# Patient Record
Sex: FEMALE | Race: ASIAN | Employment: FULL TIME | URBAN - METROPOLITAN AREA
[De-identification: names, ages, dates, MRNs, and addresses within clinical notes are randomized per-mention and may not be internally consistent; named-entity substitution may affect disease eponyms.]

---

## 2017-05-15 NOTE — TELEPHONE ENCOUNTER
Patient received a phone call from the automated system reminding patients to schedule for their follow up. Patient was reminded for HTN/CHOL. Patient stated she does not want to schedule an appointment, if there is no need for her to come in.  She state

## 2017-06-15 PROBLEM — M85.80 OSTEOPENIA: Status: ACTIVE | Noted: 2017-06-15

## 2017-06-15 NOTE — PROGRESS NOTES
HPI:    Patient ID: Joseph Spine is a 63year old female. HPI  Joseph Spine is a 63year old female. HPI:   Patient presents for recheck of her hypertension.  Pt has been taking medications as instructed, no medication side effects, Rfl: 0   Rosuvastatin Calcium 10 MG Oral Tab Take 1 tablet (10 mg total) by mouth nightly. Disp: 90 tablet Rfl: 0   TraMADol HCl (ULTRAM) 50 MG Oral Tab Take 1 tablet (50 mg total) by mouth every 6 (six) hours as needed.  Disp: 90 tablet Rfl: 0   Naproxen S well developed, well nourished,in no apparent distress  NECK: supple,no adenopathy,  LUNGS: clear to auscultation  CARDIO: RRR without murmur  EXTREMITIES: no cyanosis, clubbing or edema    ASSESSMENT AND PLAN:   Pt presents for a recheck of her hypertensi Visit:  Signed Prescriptions Disp Refills    AmLODIPine Besylate 2.5 MG Oral Tab 90 tablet 0      Sig: Take 1 tablet (2.5 mg total) by mouth daily.            Imaging & Referrals:  None       II#[id number]

## 2017-06-30 NOTE — TELEPHONE ENCOUNTER
From: Julian Frausto  Sent: 6/29/2017 3:57 PM CDT  Subject: Medication Renewal Request    Miesha Miller would like a refill of the following medications:  TraMADol HCl (ULTRAM) 50 MG Oral Tab    Preferred pharmacy: [facility]

## 2017-07-20 NOTE — TELEPHONE ENCOUNTER
Per patient they are not processing her refill on tramadol because they do not have the DION[employer] number for Dr. Kevon Neal on file. Ph. [phone].

## 2017-07-20 NOTE — TELEPHONE ENCOUNTER
[facility] be faxed to them at [phone] pt informed faxed today as pharmacy requested

## 2017-07-20 NOTE — TELEPHONE ENCOUNTER
Patient called in stating she spoke with [id number] Dr Damaso Cronin and they stated they have not received the refill auth for Tramadol as yet.

## 2017-12-07 NOTE — PROGRESS NOTES
CHIEF COMPLAINT:     Patient presents with:  Blood Pressure: Follow up. HPI:   Ryann King is a 63year old female   Patient presents for recheck of  Hypertension and hyperlipdemia.  Pt has been taking medications as instructed, no medicatio Social History:  Smoking status: Never Smoker                                                              Smokeless tobacco: Never Used                      Alcohol use:  No                 REVIEW OF SYSTEMS:   GENERAL: Denies fever, chills,weight change This Encounter      COMP METABOLIC PANEL      CBC With Differential With Platelet      TSH      LIPID PANEL      VITAMIN D, 25-HYDROXY      Hemoglobin A1C [E]    Meds & Refills for this Visit:  Signed Prescriptions Disp Refills    Rosuvastatin Calcium 10 M

## 2018-03-08 NOTE — PROGRESS NOTES
HPI:    Patient ID: Gwyn Fry is a 63year old female. HPI  Gwyn Fry is a 63year old female.   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  2011: KNEE REPLACEMENT SURGERY Right      Comment: Maria Luisa  01/01/1999: LAPAROSCOPY PROCEDURE UNLISTED      Comment: (diagnostic) for endometriosis  2006: ABIMBOLA BIOPSY STEREOTA understanding of these issues and agrees to the plan. The patient is asked to return based on studies . Review of Systems           Current Outpatient Prescriptions: AmLODIPine Besylate 2.5 MG Oral Tab Take 1 tablet (2.5 mg total) by mouth daily.  DUE

## 2018-03-08 NOTE — TELEPHONE ENCOUNTER
Patient called to request an order for stress test. Patient would also like to know if she needs pre certification in order to schedule an appointment

## 2018-03-12 NOTE — TELEPHONE ENCOUNTER
Patient called central scheduling to schedule    Procedure:  CARD NUC EXERCISE STRESS TEST (CPT=93017/65018) [[id number]].  Patient was instructed to call our office to follow up on pending referral. Please call

## 2018-03-12 NOTE — TELEPHONE ENCOUNTER
Please call pt and have them contact referral Dept they do all authorizations for tests and can advise her on status

## 2018-03-13 NOTE — TELEPHONE ENCOUNTER
----- Message from Stefan Renee NP sent at 3/10/2018  8:24 AM CST -----  Hgba1c 6.1 which is up from 1 yr ago.  Pt needs to watch starchy/carb foods as they can add to sugar load and try to increase activity especially walking as you will burn up the sugar

## 2018-03-14 NOTE — TELEPHONE ENCOUNTER
EKG faxed numerous times unsuccessful,Verified with Cardiology this is correct fax # She is scheduled in office this Friday 03/16/18

## 2018-03-14 NOTE — TELEPHONE ENCOUNTER
Message     Refer to Dr Danika Cabrera for abn EKG        ----- Message -----   From: Arturo Castro LPN   Sent: 4/30/9229   3:39 PM   To:  Alexis Herrmann MD            ----- Message -----   From: Samantha Sun CNA   Sent: 3/13/2018   3:24 PM   To: [facility]

## 2018-03-14 NOTE — TELEPHONE ENCOUNTER
Patient stated that she contacted the cardiologist to make an appointment. She stated that they are unable to get her in until July. She would like to speak with a nurse. She said to call her after 1:15pm today at [phone].

## 2018-03-16 NOTE — TELEPHONE ENCOUNTER
Patient called to speak with nurse regarding Potassium Chloride ER 20 MEQ. Patient states pharmacy had concern that medication would cause increased heart rate.

## 2018-05-12 NOTE — PROGRESS NOTES
CC: Preop exam.    Gwyn Fry is a 63year old female who presents for a pre-operative physical exam  By Dr. Kelly santiago. Patient is to have left TKR, secondary to arthritis to be on 6/25/18 at [facility]. No prior anaesthesia problem.  Pt compl UNLISTED      Comment: (diagnostic) for endometriosis  2006: ABIMBOLA BIOPSY STEREOTACTIC NODULE 2 SITE BILAT      Comment: negra  1976: ABIMBOLA NEEDLE LOCALIZATION W/ SPECIMEN 1 SITE RIG*      Comment: negra  01/01/2002: OTHER SURGICAL HISTORY      Comment: excision o back is not tender, FROM of the back  EXTREMITIES: no cyanosis, clubbing or edema  NEURO: Oriented times three,cranial nerves are intact,motor and sensory are grossly intact    ASSESSMENT AND PLAN:   Ivory Wetzel is a 63year old female who presen

## 2018-07-30 NOTE — TELEPHONE ENCOUNTER
It is not unusual to have some slight variability with the US studies when they measure .   In reviewing the reports, it looks stable

## 2018-08-09 NOTE — TELEPHONE ENCOUNTER
Requesting ATENOLOL-CHLORTHALIDONE 100-25 MG Oral Tab  LOV: 05/12/2018 with Courtney  RTC: NA  Last Relevant Labs: 03/09/2018  Filled: 05/11/2018 #90 with 0 refills    No future appointments.

## 2018-08-14 NOTE — ED PROVIDER NOTES
Patient Seen in: [facility]    History   Patient presents with:  Abdominal Pain  Diarrhea    Stated Complaint: diarrhea 3days    72-year-old female presents today with complaints of diarrhea and generalized abdominal pain start foot    Family history reviewed and is not pertinent to presenting problem. Smoking status: Never Smoker                                                              Smokeless tobacco: Never Used                      Alcohol use:  No                Revie CULTURE, ROUTINE       ED Course as of Aug 14 1107  ------------------------------------------------------------  Ct Abdomen+pelvis(contrast Only)(cpt=74177)    Result Date: 8/14/2018  PROCEDURE:  CT ABDOMEN+PELVIS (CONTRAST ONLY) (CPT=74177)  COMPARISON: MDM     Patient presents today with generalized abdominal pain and diarrhea. CMP did show a potassium of 2.9. CBC within normal limits.   CT scan did show pancolitis as well as a diffuse fatty infiltration of liver and a known abdominal aortic ane

## 2018-08-14 NOTE — ED INITIAL ASSESSMENT (HPI)
C/o abdominal pain/cramping on and off since Saturday with diarrhea about 4-5 times each days, with fever, muscle aches and cold sweats. Taking Imodium. Denies nausea nor vomiting. Tolerating fluids. Denies urinary symptoms. Denies recent travel.

## 2018-08-17 NOTE — PROGRESS NOTES
Darling Miller is a 66year old female.    HPI:   Patient presents with:  Urgent Care F/u: Acute colitis would like to follow up and learn more about colitis   Test Results: would like to go over stool test and lab work   Patient presents for follow TAKE 1 TABLET DAILY (DUE FOR FASTING LABS), Disp: 90 tablet, Rfl: 0  •  Rosuvastatin Calcium 10 MG Oral Tab, Take 1 tablet (10 mg total) by mouth nightly.  DUE FOR FASTING LABS!, Disp: 90 tablet, Rfl: 0  •  TraMADol HCl (ULTRAM) 50 MG Oral Tab, Take 1 table hepatosplenomegaly, bowel sounds throughout. Mild tenderness to palpation in several quadrants of abdomen  PSYCH: pleasant, appropriate mood and affect  ASSESSMENT AND PLAN:   1. Pancolitis  Acute issue, seen on CT scan at immediate care.   Patient's diar General (Family Practice)  The patient indicates understanding of these issues and agrees to the plan. The patient is asked to return to clinic in 1-2 months with Dr. Rusty García MD for follow up on chronic issues, or earlier if acute issues arise.     R

## 2018-08-17 NOTE — PATIENT INSTRUCTIONS
- Continue antibiotics until they are finished  - Follow up with Dr. Pallavi Jarvis clinic as scheduled. - Get blood tests done any time after Labor day. Try to fast for the blood tests. It was a pleasure seeing you in the clinic today.   Thank you for cho

## 2018-09-04 NOTE — TELEPHONE ENCOUNTER
Medication(s) to Refill:   Pending Prescriptions Disp Refills    AMLODIPINE BESYLATE 2.5 MG Oral Tab [Pharmacy Med Name: AMLODIPINE BESYLATE TABS 2.5MG] 90 tablet 0     Sig: TAKE 1 TABLET DAILY (DUE FOR FASTING LABS)           Last Time Medication was Fill

## 2018-10-08 DIAGNOSIS — E78.00 PURE HYPERCHOLESTEROLEMIA: ICD-10-CM

## 2018-10-08 RX ORDER — ROSUVASTATIN CALCIUM 10 MG/1
10 TABLET, COATED ORAL NIGHTLY
Qty: 90 TABLET | Refills: 0 | OUTPATIENT
Start: 2018-10-08

## 2018-10-08 NOTE — TELEPHONE ENCOUNTER
Last office visit 8/17/18 with Dr. Bozena Modi. Last lipid 3/9/18. Last refill 3/7/18 (#90, 0 refills).

## 2018-11-24 NOTE — PATIENT INSTRUCTIONS
Triglycerides  Does this test have other names? Lipid panel, fasting lipoprotein panel  What is this test?  This test measures the amount of triglycerides in your blood. Triglycerides are one of several types of fats in your blood.  Other kinds are LDL · 150 to 199 mg/dL: Borderline high  · 200 to 499 mg/dL: High  · 500 mg/dL and above: Very high  If you have a high triglyceride level, you have a greater risk for heart attack and stroke.   A triglyceride level above 150 mg/dL also means that you may have Not fasting for the required length of time before the test can affect your results. Certain medicines can affect your results, as can drinking alcohol.   How do I prepare for the test?  If you have this test as part of a cholesterol screening, you will nee

## 2018-11-24 NOTE — PROGRESS NOTES
Patient presents with:  Physical    HPI:  Concerned regarding recent triglyceride results. States she has been losing weight and dieting but numbers got worse.      Annual Depression Screen due on 05/12/2019  Mammogram due on 07/28/2019  Pap Smear,3 Years d Oral Tab Take 1 tablet (10 mg total) by mouth nightly. DUE FOR FASTING LABS!  Disp: 90 tablet Rfl: 0   AMLODIPINE BESYLATE 2.5 MG Oral Tab TAKE 1 TABLET DAILY (DUE FOR FASTING LABS) Disp: 90 tablet Rfl: 0   Cholecalciferol (VITAMIN D3) 1000 UNITS Oral Cap T without inflammation. Bilateral tympanic membranes translucent, mild fluid behind left TM. Hearing grossly normal.  EYES: PERRLA, EOMI, bilateral sclera anicteric, non-injected. Conjunctiva pink, fundi wnl. NOSE: Mucosa appears healthy.    OROPHARYNX: Muco

## 2019-02-06 NOTE — TELEPHONE ENCOUNTER
Approved per protocol  Atenolol  Last OV relevant to medication: 5-12-18  Last refill date: 11-7-18     #/refills: 0  When pt was asked to return for OV: none  Upcoming appt/reason: none  Recent labs: 10-26-18: Pathology tissue  10-24-18: HgBA1C/ Lipid/ CM

## 2019-03-04 NOTE — TELEPHONE ENCOUNTER
Amlodipine approved per protocol  Last OV relevant to medication: 11-24-18  Last refill date: 12-3-18  #/refills: 0  When pt was asked to return for OV: 1 yr CPX  Upcoming appt/reason: none  Recent labs: 10-24-18: Lipid/ HgBA1C/ CMP

## 2019-04-26 DIAGNOSIS — E78.00 PURE HYPERCHOLESTEROLEMIA: ICD-10-CM

## 2019-04-27 RX ORDER — ROSUVASTATIN CALCIUM 10 MG/1
TABLET, COATED ORAL
Qty: 90 TABLET | Refills: 0 | OUTPATIENT
Start: 2019-04-27

## 2019-04-27 NOTE — TELEPHONE ENCOUNTER
Last OV: 11/24/18 with REBEKA Mina  Last refill date: 1/25/19     #/refills: #90, 0 refills  When pt was asked to return for OV: 1 year  Upcoming appt/reason: no upcoming appt  Last labs 10/24/18

## 2019-05-06 NOTE — TELEPHONE ENCOUNTER
Rosuvastatin  Last OV relevant to medication: 11-24-18  Last refill date: 1-25-19  #/refills: 0  When pt was asked to return for OV: Cpx 1 yr  Upcoming appt/reason: none  Recent labs: 10-24-18: Lipid

## 2019-05-07 DIAGNOSIS — I10 ESSENTIAL HYPERTENSION: ICD-10-CM

## 2019-05-07 NOTE — TELEPHONE ENCOUNTER
Lm for pt to return phone call.   Pt is due for 6 month f/u BP    Atenolol-Chlorthalidone  Last OV relevant to medication: 11-24-18  Last refill date: 2-6-19  #/refills: 0  When pt was asked to return for OV: CPX 1 yr  Upcoming appt/reason: none  Recent labs: 10-24-18: CMP

## 2019-05-09 RX ORDER — ATENOLOL AND CHLORTHALIDONE TABLET 100; 25 MG/1; MG/1
TABLET ORAL
Qty: 45 TABLET | Refills: 0 | Status: SHIPPED | OUTPATIENT
Start: 2019-05-09 | End: 2019-10-09

## 2019-06-03 NOTE — TELEPHONE ENCOUNTER
Pt is scheduled for HTN appt. With Cristiano @10am. Pt states if any blood work is required, if we can order it so she can get it done prior to coming in. Please advise, pt would like a call back or [employer] message to let her know about labs.   Amlodipine f

## 2019-06-14 NOTE — PROGRESS NOTES
CHIEF COMPLAINT:     Patient presents with:  Hypertension: Bp f/u      HPI:   Bibiana Sanchez is a 66year old female   Patient presents for recheck of  Hypertension and hyperlipdemia.  Pt has been taking medications as instructed, no medication side eff Never Used    Alcohol use: No      Alcohol/week: 0.0 oz    Drug use: No       REVIEW OF SYSTEMS:   GENERAL: Denies fever, chills,weight change, decreased appetite  SKIN: Denies rashes, skin wounds or ulcers.   EYES: Denies blurred vision or double vision  H history of pancreatic cancer  - Pt's brother had pancreatic cancer and wants to know if there is any test to check her for it. I explained this test is more of a screening test but Pt would like it done. - CARBOHYDRATE ANTIGEN 19-9; Future    4.  Essential

## 2019-06-26 NOTE — TELEPHONE ENCOUNTER
Notes recorded by THERESE Gill on 6/15/2019 at 8:42 AM CDT  Pt has immunity so Pt did have chicken pox when she was younger and so she can get the Shingles vaccine thru her pharmacy. Hgba1c up from 7 months ago. Pt to watch her diet, exercise.  [place]

## 2019-08-15 NOTE — PATIENT INSTRUCTIONS
Treating Gout Attacks     Raising the joint above the level of your heart can help reduce gout symptoms. Gout is a disease that affects the joints. It is caused by excess uric acid in your blood that may lead to crystals forming in your joints.  Left ? Shellfish (lobster, crab, shrimp, scallop, mussel)  ? Certain fish (anchovy, sardine, herring, mackerel)  · Take any medicines prescribed by your healthcare provider. · Lose weight if you need to. · Reduce high fructose corn syrup in meals and drinks.

## 2019-08-15 NOTE — PROGRESS NOTES
CHIEF COMPLAINT:     Patient presents with: Foot Pain: Rt foot pt is having pain and has swelling on the top of her foot, Started Tuesday felt like a strain, but then the upper foot started hurting. Swelling started yesterday.  OTC ibprofen      HPI:   [de-identified] Denies skin wounds or ulcers.   HENT: Denies congestion, rhinorrhea, sore throat or ear pain  CHEST: Denies chest pain, or palpitations  LUNGS: Denies shortness of breath, cough, or wheezing  GI: Denies abdominal pain, N/V/C/D.   MUSCULOSKELETAL: right foot

## 2019-10-10 NOTE — TELEPHONE ENCOUNTER
Pharmacy pt requested us to send medication to does not exsit or wrong info. Was typed in incorrect. [facility] has location in [place] [address] or [facility]. Need to call pt.     Rosuvastatin 10 Mg  Last OV relevant to medication: 6-14-19  Last refill robi

## 2019-10-16 NOTE — TELEPHONE ENCOUNTER
Original order was sent to wrong Pharmacy on 10/10/19. Needed to be sent to [facility].

## 2019-10-23 NOTE — PROGRESS NOTES
CHIEF COMPLAINT:     Patient presents with:  Hypertension: Pt brought in Wellness form to be filled out for work. Needs flu shot. HPI:   Gabriele Mandujano is a 60year old female   Patient presents for recheck of  Hypertension and hyperlipdemia.  Pt congestion, rhinorrhea, sore throat or ear pain  CHEST: Denies chest pain, or palpitations  LUNGS: Denies shortness of breath, cough, or wheezing  NEURO: Denies headaches or lightheadedness      EXAM:   /72 (BP Location: Left arm, Patient Position: S understanding of these issues and agrees to the plan. The patient is asked to return in ^ months for BP check but Edis for Px.

## 2019-11-18 NOTE — TELEPHONE ENCOUNTER
From: Kitty Pugh  To: THERESE Lozano  Sent: 11/18/2019 9:40 AM CST  Subject: Prescription Question    I visited you last time regarding Gout. It's back and I'm having a lot of pain and could hardly walk.  Can you possibly call in for the same

## 2020-01-25 NOTE — TELEPHONE ENCOUNTER
Pt scheduled diabetes education however she then called to cancel. Although it is covered she would have to meet her deductible first. [facility]. Hopefully she will schedule once her deductible is met.

## 2020-02-24 NOTE — TELEPHONE ENCOUNTER
Sent my chart message stating pt is due for CPX    Amlodipine 2.5 mg  Last OV relevant to medication: 10-23-19  Last refill date: 10-23-19 #/refills: 0  When pt was asked to return for OV: Jan.2020  Upcoming appt/reason: none  Recent labs: 12-12-19: CMP

## 2020-03-09 NOTE — TELEPHONE ENCOUNTER
Pt is to f/u in 3 mo. For lab work, she has upcoming appt. On 4-4-2020.      Metformin HCl  mg failed protocol due to  Diabetic Medication Protocol Failed3/7 4:29 PM   Microalbumin procedure in past 12 months or taking ACE/ARB   Last OV relevant to me

## 2020-04-02 NOTE — TELEPHONE ENCOUNTER
Metformin 500 mg failed protocol due to  Diabetic Medication Protocol Failed4/2 1:27 PM   Microalbumin procedure in past 12 months or taking ACE/ARB   Last OV relevant to medication: 6-14-19  Last refill date: 1-6-20 #/refills: 0  When pt was asked to retu

## 2020-05-05 NOTE — TELEPHONE ENCOUNTER
Difficult to say, could be plantar fascitis. Needs visit in office to assess foot.  Schedule for 1-2 weeks from now in office when we start to bring more patients back to office visits

## 2020-05-05 NOTE — TELEPHONE ENCOUNTER
From: Adrian Paul  To: THERESE Croft  Sent: 5/5/2020 2:45 PM CDT  Subject: Non-Urgent Medical Question    Continuation, I'm practicing social distancing and voluntary quarantine. Should I get an X-ray, Ultrasound or CT to find the casue?  Does i

## 2020-05-18 NOTE — PROGRESS NOTES
Sandra Gann is a 60year old female. Patient presents with:   Foot Pain: patient states that for 2 weeks shes been having right foot pain and swelling - very painful to walk - pain scale 10/10 - does not recall any injury to the foot - takes ibuprofe [address]   • D & C  1995, [id number]   • DILATION & CURETTAGE CERVICAL STUMP      x5 pt has had 5 miscarraiges and  a D&C after each one   • EXCISION, INFEC GRAFT, NECK  late 1980    nodule removed from R side of neck the nodule was proven to be  a  [employer] distress  EXTREMITIES:   1. Integument: Normal skin temperature and turgor  2.  Vascular: Dorsalis pedis two out of four bilateral and posterior tibial pulses two out of   four bilateral, capillary refill normal.   3. Musculoskeletal: All muscle groups are

## 2020-06-19 PROBLEM — E11.9 TYPE 2 DIABETES MELLITUS WITHOUT COMPLICATION, WITHOUT LONG-TERM CURRENT USE OF INSULIN (HCC): Status: ACTIVE | Noted: 2020-06-19

## 2020-06-19 NOTE — PROGRESS NOTES
Patient presents with:  Physical: With Pap. HPI:  Pt is here for physical and pap. Pt feels a pulsation in her chest/upper abdomen at times. Pt has hx of AAA. Pt had recent US and AAA has shrunk in size.      Pap Smear,3 Years due on 11/08/2019  [facility] Medications   Medication Sig Dispense Refill   • Rosuvastatin Calcium 10 MG Oral Tab Take 1 tablet (10 mg total) by mouth nightly. 90 tablet 0   • Fenofibrate 145 MG Oral Tab Take 1 tablet (145 mg total) by mouth daily.  90 tablet 0   • METFORMIN HCL  Size: adult)   Pulse 78   Resp 16   Ht 59\"   Wt 146 lb (66.2 kg)   BMI 29.49 kg/m²   NAD  GENERAL: well developed, well nourished, in no apparent distress.   EARS: Bilateral pinna / tragus are WNL in appearance, External canals patent and without inflammat THINPREP PAP SMEAR B  - HPV HIGH RISK , THIN PREP COLLECTION    3. Pure hypercholesterolemia  - Rosuvastatin Calcium 10 MG Oral Tab; Take 1 tablet (10 mg total) by mouth nightly. Dispense: 90 tablet; Refill: 0  - Fenofibrate 145 MG Oral Tab;  Take 1 tablet

## 2020-07-08 NOTE — TELEPHONE ENCOUNTER
Metformin 500 mg failed protocol due to  Diabetic Medication Protocol Failed7/7 6:40 PM   Microalbumin procedure in past 12 months or taking ACE/ARB   Last OV relevant to medication: 6-19-20  Last refill date:  #/refills:  When pt was asked to return for O

## 2020-09-16 NOTE — TELEPHONE ENCOUNTER
LOV: 6/19/2020 with REBEKA Cobb  RTC: 6 months  Last Relevant Labs: June/July 2020   Filled: 6/19/2020  #90 with 0 refill    No future appointments.

## 2020-10-02 NOTE — TELEPHONE ENCOUNTER
From: Adrian Paul  To: Jenae Bernabe MD  Sent: 10/1/2020 5:53 PM CDT  Subject: Non-Urgent Medical Question    Can you please recommend a doctor who can treat neck radicular pain?  The pain is coming from the neck to the back shoulder and to the right

## 2020-10-06 NOTE — TELEPHONE ENCOUNTER
Spoke with patient scheduled OV with KR on 10/9/2020 for evaluation, patient Is unable to come in sooner. Patient verbalized understanding and agreeable to POC.

## 2020-10-09 NOTE — TELEPHONE ENCOUNTER
[facility]: \"Shoulder pain.  Pain radiates from the back of the shoulder to the arm. \"  Please call to triage further

## 2020-10-09 NOTE — TELEPHONE ENCOUNTER
Spoke with patient discussed right arm and shoulder pain, stating works from home 8-9 hours a day sitting down, has been ongoing for 2 weeks. See My chart message dated 10/1/2020. Patient will keep appt on 10/10/2020.  Patient verbalized understanding and a

## 2020-10-10 NOTE — PROGRESS NOTES
CHIEF COMPLAINT:     Patient presents with:  Shoulder Pain: R shoulder pain, pain radiating down arm       HPI:   Eulalio Shaikh is a 63year old female. Patient presents with chief complaint of pain to right shoulder.       Cause - no known injury, pt Smoking status: Never Smoker      Smokeless tobacco: Never Used    Alcohol use: No      Alcohol/week: 0.0 standard drinks    Drug use: No       REVIEW OF SYSTEMS:   GENERAL HEALTH: feels well otherwise  SKIN: denies any unusual skin lesions or rashes  RESP Prescriptions     Signed Prescriptions Disp Refills   • methylPREDNISolone (MEDROL) 4 MG Oral Tablet Therapy Pack 1 kit 0     Sig: As directed.    • methocarbamol 750 MG Oral Tab 30 tablet 0     Sig: Take 1 tablet (750 mg total) by mouth 3 (three) times som knees should be level with your hips or just a bit lower. Sit as straight as you can. The curve of your lower back should be fully supported. · Walking. Stand tall and walk with your head up. Let your arms swing while you walk. This helps relax muscles.  Thamas Necessary nerves that go from the spinal cord down the arms and into the torso. What causes cervical radiculopathy? Aging, injury, poor posture, and other issues can lead to problems in the neck. These problems may then irritate nerve roots.  These include:  · [place] sure to let your healthcare provider know.     When to call your healthcare provider  Call your healthcare provider right away if you have any of these:  · New pain or pain that gets worse  · New or increasing weakness, numbness, or tingling in your arm or

## 2020-10-10 NOTE — PATIENT INSTRUCTIONS
Back Safety: Basics of Good Posture  Good posture helps protect you from injury. It also increases your comfort. Aim for good posture throughout the day. Check your posture  The human body works best when it is properly aligned.  To improve your stand Sitting and sleeping. Choose your furniture with care. Make sure it’s not causing or increasing your back pain. Chairs should allow for comfortable, correct sitting posture. Use pillows for added support if needed.  Your bed should support your back’s natur tear, and aging. This can lead to narrowing (stenosis) of the openings between the vertebrae. The narrowed openings press on nerve roots as they leave the spinal canal.  · An unstable spine. This is when a vertebra slips forward.  It can then press on a ner [address]. 1407 [employer], [address] [place] [address]. All rights reserved. This information is not intended as a substitute for professional medical care. Always follow your healthcare professional's instructions.

## 2020-11-09 NOTE — TELEPHONE ENCOUNTER
Atenolol-Chlorthalidone 100-25 mg  Last OV relevant to medication: 6-19-20  Last refill date: 7-8-20 #/refills: 0  When pt was asked to return for OV: 6 mo DM  Upcoming appt/reason: none  Recent labs: 12-12-19: CMP

## 2020-12-14 NOTE — TELEPHONE ENCOUNTER
Sent my chart message stating pt is due for Labs (already ordered) and OV for 6 month f/u.      Amlodipine 2.5 mg failed protocol due to  Hypertension Medications Protocol Rpyzal7612/14/2020 09:43 AM   CMP or BMP in past 12 months   Filled 7-8-20  Qty 90  0 r

## 2020-12-14 NOTE — TELEPHONE ENCOUNTER
LOV: 10/10/2020 with REBEKA Yi  RTC: no follow-up on file  Last Relevant Labs: 6/9/2020   Filled: 9/17/2020  #90 with 1 refill    Future Appointments   Date Time Provider Miladis Villatoro   12/14/2020  1:00  [address]

## 2020-12-28 NOTE — TELEPHONE ENCOUNTER
From: Oma Murphy  To: THERESE Bingham  Sent: 12/28/2020 11:52 AM CST  Subject: Test Results Question    I received a message from your office stating I need additional views for my mammogram. Is this for just a routine mammogram or a diffe

## 2020-12-29 NOTE — TELEPHONE ENCOUNTER
From: Barron Lemus  To: THERESE Berrios  Sent: 12/29/2020 3:47 PM CST  Subject: Test Results Question    Received your letter regarding the request for an additional view on a mammogram. I've been calling the Radiology department for 2 days

## 2020-12-30 NOTE — TELEPHONE ENCOUNTER
I called and left a message with the mammography dpt ([phone])    Spoke with patient informing, patient then stated she was received notification of a VM left by the above number.  Advised this would be the mammogram department and to listen to the VM

## 2020-12-30 NOTE — TELEPHONE ENCOUNTER
\"Asymmetry within fear left breast on MLO view which additional imaging is recommended\" radiology had noted they would call patient.  This will be a diagnostic mammogram to get more views of specific area of concerns

## 2020-12-31 NOTE — TELEPHONE ENCOUNTER
Patient is already scheduled.     Future Appointments   Date Time Provider Miladis Villatoro   1/8/2021  2:20 PM PF [facility] RM2 PF [facility]

## 2021-01-04 NOTE — TELEPHONE ENCOUNTER
Sent pt my chart message on 12- which was read by pt stating she is due for 6 month follow up for BP f/u and pt is due for lab work which was placed in December 2020.     Amlodipine 2.5 mg failed protocol due to   Hypertension Medications Protocol Fa

## 2021-01-08 NOTE — TELEPHONE ENCOUNTER
Tank Man RN 1/8/2021 12:35 PM CST      ----- Message -----  From: Prakash Guido  Sent: 1/8/2021 12:23 PM CST  To: [facility] Clinical Staff  Subject: Prescription Question     I'm not sure what is going on, but I did not receive refills for those

## 2021-01-30 NOTE — PROGRESS NOTES
HPI:   Gwyn Fry is a 63year old female who presents for recheck of her diabetes. Patient doesn't check her blood sugar at home. She has been following a low carb diet for 2 weeks now, avoiding bread, rice, and pasta.  Last visit with ophthal 06/09/2020 42   12/12/2019 74   06/14/2019      Comment:     Unable to calculate LDL due to Triglycerides >400.0 mg/dL      Desirable <100 mg/dL   Borderline 100-129 mg/dL   High     >=130mg/dL         LDL-CHOLESTEROL (mg/dL (calc))   Date Value   05/18/ Osteoarthritis     Had bilateral TKR  &    • Pure hypercholesterolemia 2013      Past Surgical History:   Procedure Laterality Date   • BREAST SURGERY PROCEDURE UNLISTED      left breast biopsy during breast surgery   •   2001 Hematological:Negative for hypoglycemia   Psychiatric/Behavioral: Negative for sleep disturbance.     EXAM:   BP 90/70 (BP Location: Right arm, Patient Position: Sitting, Cuff Size: adult)   Pulse 72   Temp 99.2 °F (37.3 °C) (Oral)   Resp 16   Ht 4' 11\" Hyperlipidemia associated with type 2 diabetes mellitus ([facility] [address]  - diet discussed, continue medications  - LIPID PANEL; Future    4.  Elevated liver enzymes  - discussed use of NSAIDS, tylenol, etc  - recheck labs  in 1 month    The patient indicates Bruno Jackson

## 2021-02-18 NOTE — TELEPHONE ENCOUNTER
Atenolol- Chlorthalidone 100-25 mg  Filled 11-9-20  Qty 90  0 refills  No upcoming appt.    LOV 1-30-21

## 2021-04-29 NOTE — TELEPHONE ENCOUNTER
Passed protocol    Requesting metFORMIN HCl  MG Oral Tablet 24 Hr  LOV: 1/30/21  RTC: 6 months  Last Relevant Labs: 1/16/21  Filled: 1/30/21 #90 with 0 refills        Requesting amLODIPine Besylate 2.5 MG Oral Tab  Last Relevant Labs: 1/16/21  Filled

## 2021-05-18 NOTE — TELEPHONE ENCOUNTER
Fenofibrate 145 MG Oral Tab          Sig: Take 1 tablet (145 mg total) by mouth daily.     Disp:  90 tablet    Refills:  1    Start: 5/18/2021    Class: Normal    Non-formulary For: Pure hypercholesterolemia    Last ordered: 5 months ago by Je Trotter

## 2021-05-18 NOTE — TELEPHONE ENCOUNTER
Medication(s) to Refill:   Requested Prescriptions     Pending Prescriptions Disp Refills   • Atenolol-Chlorthalidone 100-25 MG Oral Tab 90 tablet 0     Sig: Take 1 tablet by mouth daily.        LOV: 01/30/21  RTC: not on file      Last Refill: 2/18/21    A

## 2021-06-14 NOTE — TELEPHONE ENCOUNTER
Placed form in Mariaa's in basket.     Subject: members w/ DM and/or artherosclerotic cardiovascular disease w/o claims for a statin

## 2021-06-25 NOTE — PROGRESS NOTES
HPI:   Maria Teresa Jones is a 61year old female who presents for a complete physical exam. Symptoms: denies discharge, itching, burning or dysuria, is menopausal. Patient complains of : none.  Pt has been working on her diet and has been doing the mNectar 11/25/2008  10/23/2017  09/19/2018      Pneumococcal (Prevnar 13)                          06/14/2019      Pneumovax 23          10/10/2020      TDAP                  10/07/2015    Wt Readings from Last 6 Encounters:  06/26/21 : 133 lb 12 oz (60.7 kg)  01/ Benign neoplasm of colon    • Essential hypertension     Controlled by medications   • Hyperlipidemia     LDL elevated   • Osteoarthritis     Had bilateral TKR 2012 & 2018   • Pure hypercholesterolemia 6/22/2013      Past Surgical History:   Procedure Late denies dysuria, vaginal discharge or itching   MUSCULOSKELETAL: denies back pain, chronic knee pain  NEURO: denies headaches  PSYCHE: denies depression or anxiety  HEMATOLOGIC: denies hx of anemia  ENDOCRINE: denies thyroid history, +DM  ALL/ASTHMA: + niki (primary encounter diagnosis)  Immunity status testing    Orders Placed This Encounter      CBC With Differential With Platelet      Comp Metabolic Panel (14)      Lipid Panel      Vitamin D, 25-Hydroxy      Hemoglobin A1C      TSH W Reflex To Free T4

## 2021-08-04 NOTE — TELEPHONE ENCOUNTER
metFORMIN HCl  MG Oral Tablet 24 Hr         Sig: Take 1 tablet (500 mg total) by mouth daily.     Disp:  90 tablet    Refills:  0    Start: 8/4/2021    Class: Normal    Non-formulary For: Type 2 diabetes mellitus without complication, without long-te (H) 01/16/2021

## 2021-08-30 NOTE — TELEPHONE ENCOUNTER
Atenolol-Chlorthalidone 100-25 MG Oral Tab          Sig: Take 1 tablet by mouth daily.     Disp:  90 tablet    Refills:  0    Start: 8/30/2021    Class: Normal    Non-formulary For: Essential hypertension    Last ordered: 3 months ago by THERESE Russo

## 2021-08-30 NOTE — TELEPHONE ENCOUNTER
fenofibrate 145 MG Oral Tab          Sig: Take 1 tablet (145 mg total) by mouth daily.     Disp:  90 tablet    Refills:  1    Start: 8/30/2021    Class: Normal    Non-formulary For: Pure hypercholesterolemia    Last ordered: 3 months ago by Emily Sheffield

## 2021-09-24 NOTE — TELEPHONE ENCOUNTER
rosuvastatin (CRESTOR) 5 MG Oral Tab          Sig: Take 1 tablet (5 mg total) by mouth nightly.     Disp:  30 tablet    Refills:  1    Start: 9/24/2021    Class: Normal    Non-formulary    Last ordered: 1 month ago by THERESE Tavares     Patient comment:

## 2021-11-26 NOTE — TELEPHONE ENCOUNTER
Atenolol-chlorthalidone 100-25 mg  Filled 8-30-21  Qty 90  0 refills  No upcoming appt.    LOV 6-26-21

## 2021-12-19 NOTE — PROGRESS NOTES
Ashvin Hanks is a 61year old female. Patient presents with: Ankle Pain: Left ankle sprained, onset saturday, pt states walking down the steps twisted ankle, is swollen and bruised,rates pain 10/10 today.  former pt dr Saritha Alba        HPI:   [facility] breast surgery   •   2001   • COLONOSCOPY  10/26/2018   • COLONOSCOPY & POLYPECTOMY  5/09   10/24/14    yvettebrian Patel   • D & C  , 0187,0709,1236   • DILATION & CURETTAGE CERVICAL STUMP      x5 pt has had 5 miscarraiges and  a D&C after Integument: Skin on the patient's left foot and ankle was examined turgor is markedly decreased not so much in the forefoot but around the ankle especially the lateral ankle. There is ecchymosis as well.    2. Vascular: Patient has palpable pulses both jovani

## 2021-12-30 NOTE — PROGRESS NOTES
Ryann King is a 61year old female. Patient presents with: Follow - Up: Follow up Left ankle sprain.  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 Neurologic: Patient has intact sensorium   4. Musculoskeletal: Has some pain on the inferior aspect of the fibular malleolus but a little bit posterior to where the calcaneofibular ligament would be.     ASSESSMENT

## 2022-01-13 NOTE — TELEPHONE ENCOUNTER
LOV 6/26/21 with SM     Last mammo 1/8/21:     BI-RADS CATEGORY:     DIAGNOSTIC CATEGORY 2--BENIGN FINDING:         RECOMMENDATIONS:     ROUTINE MAMMOGRAM AND CLINICAL EVALUATION IN 12 MONTHS.        Order placed per protocol. Patient notified.

## 2022-02-11 DIAGNOSIS — I10 ESSENTIAL HYPERTENSION: ICD-10-CM

## 2022-02-13 RX ORDER — ATENOLOL AND CHLORTHALIDONE TABLET 100; 25 MG/1; MG/1
1 TABLET ORAL DAILY
Qty: 90 TABLET | Refills: 0 | OUTPATIENT
Start: 2022-02-13

## 2022-02-13 RX ORDER — AMLODIPINE BESYLATE 2.5 MG/1
2.5 TABLET ORAL DAILY
Qty: 90 TABLET | Refills: 0 | OUTPATIENT
Start: 2022-02-13

## 2022-02-13 RX ORDER — ROSUVASTATIN CALCIUM 5 MG/1
5 TABLET, COATED ORAL NIGHTLY
Qty: 90 TABLET | Refills: 0 | OUTPATIENT
Start: 2022-02-13

## 2022-02-15 NOTE — TELEPHONE ENCOUNTER
Sent [email or web address] message stating pt is due for 6 month f/u. Refill sent. Amlodipine 2.5 mg failed protocol due to  Hypertension Medications Protocol Failed 02/14/2022 08:31 PM   Protocol Details  Appointment in past 6 or next 3 months   Filled 11-8-21  Qty 90  0 refills  No upcoming appt. LOV 6-26-21    Rosuvastatin 5 mg  Filled 9-24-21  Qty 90  0 refills  No upcoming appt.    LOV 6-26-21

## 2022-02-15 NOTE — TELEPHONE ENCOUNTER
Hydrocodone pending for review and approval.    Last office visit: 12/30/21    No upcoming apt.      Last refill: 12/14/21

## 2022-02-15 NOTE — TELEPHONE ENCOUNTER
The patient did not follow through with the physical therapy that I prescribed. Therefore I will not represcribe any type of narcotic analgesic and ~I can reevaluate the patient and even then she has to do the physical therapy.

## 2022-06-27 NOTE — TELEPHONE ENCOUNTER
printed Silver Nitrate Text: The wound bed was treated with silver nitrate after the biopsy was performed.

## 2022-06-30 NOTE — TELEPHONE ENCOUNTER
Spoke to pt. She is aware of message. Medication (lisinopril 20 mg) sent to pharmacy (verified with pt.). Pt aware of needing to recheck lipid panel in 3 months and schedule visit with SM in 4-6 wks after starting Lisinopril. Pt requesting [facility] sent to her with notes.

## 2022-06-30 NOTE — TELEPHONE ENCOUNTER
I spoke to Dr. Keith Barrios regarding her concerns. He is ok with her stopping her her Crestor but would like her lipids rechecked in 3 months to see where they are at. As for her BP medications, he would like her to continue the Amlodipine. As for the atenolol-chlorthalidone he wants her to take half a pill for a week and then stop it and then start Lisinopril 20 mg daily #30 with 1 RFBonilla Weber Pt will need a BP check in the office in 4-6 weeks after the change in medication.

## 2022-09-01 NOTE — TELEPHONE ENCOUNTER
Spoke to patient she states insurance needs code to state preventive care. Writer has called Radiology and central scheduling and no one knows what pre authorization codes the patient needs. Per SX, RN screening is equivalent to preventive. There are two other codes found Z87.39 and Z13.820 Z78.0. Those codes were provided to patient to ask insurance if those were appropriate. Patient states she cannot call insurance again because she has called four times today. Informed patient that screening is equivalent to preventive which is what the order is placed under, unless insurance provides us with the code they need we are unsure how to place order. Patient verbalized understanding.

## 2022-09-01 NOTE — TELEPHONE ENCOUNTER
Pt requesting pre authorization for Dexa Bone Scan that is scheduled on 10/1. Pt has [address].      Pt requesting call for updates and best call back is [phone]

## 2022-09-21 NOTE — TELEPHONE ENCOUNTER
Patient stated that she was interested in Surgery, but wanted to know who submitted the Prior Auth and if she would know up front an approximate estimate of her payment. Advsd that I would let Laurelilana Devin know that she was interested and that would start the process.

## 2022-09-22 NOTE — TELEPHONE ENCOUNTER
Procedure: Dorsal exostectomy second met cuneiform joint, right foot, removal of painful bone screws first metatarsal right foot  CPT code: 05045, 85815  Length of Surgery: 45 minutes  Any Instruments: Mini power, mini fluoroscopy  Call patient: ASAP  Anesthesia: MAC  Location: [facility]  Assistance: none  Pacemaker: No  Anticoagulants: No  Nickel Allergy: No  Latex Allergy: No  Diagnosis/ICD Code:   (M79.671) Right foot pain  (primary encounter diagnosis)  Plan:     (M49.4P9) Exostosis of right foot  Plan:     (Z96.9) Retained orthopedic hardware  Plan:

## 2022-09-22 NOTE — TELEPHONE ENCOUNTER
Called patient this date and she is not ready to schedule yet but wanted procedure codes and diagnosis codes to try and get coverage information. Gave her those but changed [phone] to [phone].   Patient will call me back directly when ready to schedule at [phone]

## 2022-12-02 NOTE — TELEPHONE ENCOUNTER
Future Appointments   Date Time Provider Miladis Villatoro   12/12/2022  4:00 PM [facility] NURSE [facility] [place]     [place] for 2nd shingles. Place orders if needed.

## 2023-01-27 NOTE — TELEPHONE ENCOUNTER
Received medical records request from    [facility] of [address] [id number]  [facility] [address]  Fax: [phone]  Ph: [phone]    Forwarded to medical records department for processing.

## 2023-02-16 ENCOUNTER — ORDER TRANSCRIPTION (OUTPATIENT)
Dept: ADMINISTRATIVE | Facility: HOSPITAL | Age: 68
End: 2023-02-16

## 2023-02-16 DIAGNOSIS — Z12.31 ENCOUNTER FOR SCREENING MAMMOGRAM FOR MALIGNANT NEOPLASM OF BREAST: Primary | ICD-10-CM

## 2023-02-18 ENCOUNTER — HOSPITAL ENCOUNTER (OUTPATIENT)
Dept: MAMMOGRAPHY | Age: 68
Discharge: HOME | End: 2023-02-18
Attending: FAMILY MEDICINE
Payer: COMMERCIAL

## 2023-02-18 DIAGNOSIS — Z12.31 ENCOUNTER FOR SCREENING MAMMOGRAM FOR MALIGNANT NEOPLASM OF BREAST: ICD-10-CM

## 2023-02-18 PROCEDURE — 77063 BREAST TOMOSYNTHESIS BI: CPT | Performed by: FAMILY MEDICINE

## 2023-02-18 PROCEDURE — 77067 SCR MAMMO BI INCL CAD: CPT | Performed by: FAMILY MEDICINE

## 2023-03-13 ENCOUNTER — PATIENT MESSAGE (OUTPATIENT)
Dept: INTERNAL MEDICINE CLINIC | Facility: CLINIC | Age: 68
End: 2023-03-13

## 2023-03-13 NOTE — TELEPHONE ENCOUNTER
From: Pop Escamilla  To: Edilberto Rivera MD  Sent: 3/13/2023 9:40 AM CDT  Subject: Dilated Retinal Eye Exam    Dilated Retinal Eye Exam has been done. I have requested test results be sent to your office. It was done in [facility] at [address], [place], [address] with phone number [phone]. If this information has not been received, kindly contact them and provide your fax number so they can forward the results. Thank you.

## 2023-03-13 NOTE — TELEPHONE ENCOUNTER
Have not received-will look out for fax if not will request from [facility]

## 2023-04-07 ENCOUNTER — OFFICE VISIT (OUTPATIENT)
Dept: INTERNAL MEDICINE CLINIC | Facility: CLINIC | Age: 68
End: 2023-04-07
Payer: COMMERCIAL

## 2023-04-07 VITALS
OXYGEN SATURATION: 97 % | HEART RATE: 59 BPM | HEIGHT: 58.75 IN | BODY MASS INDEX: 26.93 KG/M2 | WEIGHT: 131.81 LBS | TEMPERATURE: 98 F | SYSTOLIC BLOOD PRESSURE: 132 MMHG | DIASTOLIC BLOOD PRESSURE: 82 MMHG

## 2023-04-07 DIAGNOSIS — E78.00 PURE HYPERCHOLESTEROLEMIA: ICD-10-CM

## 2023-04-07 DIAGNOSIS — E11.9 TYPE 2 DIABETES MELLITUS WITHOUT COMPLICATION, WITHOUT LONG-TERM CURRENT USE OF INSULIN (HCC): ICD-10-CM

## 2023-04-07 DIAGNOSIS — I10 ESSENTIAL HYPERTENSION: Primary | ICD-10-CM

## 2023-04-07 DIAGNOSIS — R59.9 SWOLLEN GLAND: ICD-10-CM

## 2023-04-07 PROCEDURE — 3079F DIAST BP 80-89 MM HG: CPT | Performed by: FAMILY MEDICINE

## 2023-04-07 PROCEDURE — 3008F BODY MASS INDEX DOCD: CPT | Performed by: FAMILY MEDICINE

## 2023-04-07 PROCEDURE — 3075F SYST BP GE 130 - 139MM HG: CPT | Performed by: FAMILY MEDICINE

## 2023-04-07 PROCEDURE — 99214 OFFICE O/P EST MOD 30 MIN: CPT | Performed by: FAMILY MEDICINE

## 2023-04-08 ENCOUNTER — LAB ENCOUNTER (OUTPATIENT)
Dept: LAB | Age: 68
End: 2023-04-08
Attending: FAMILY MEDICINE
Payer: COMMERCIAL

## 2023-04-08 DIAGNOSIS — R59.9 SWOLLEN GLAND: ICD-10-CM

## 2023-04-08 DIAGNOSIS — E11.9 TYPE 2 DIABETES MELLITUS WITHOUT COMPLICATION, WITHOUT LONG-TERM CURRENT USE OF INSULIN (HCC): ICD-10-CM

## 2023-04-08 DIAGNOSIS — E78.00 PURE HYPERCHOLESTEROLEMIA: ICD-10-CM

## 2023-04-08 DIAGNOSIS — I10 ESSENTIAL HYPERTENSION: ICD-10-CM

## 2023-04-08 LAB
ALBUMIN SERPL-MCNC: 4 G/DL (ref 3.4–5)
ALBUMIN/GLOB SERPL: 1 {RATIO} (ref 1–2)
ALP LIVER SERPL-CCNC: 82 U/L
ALT SERPL-CCNC: 28 U/L
ANION GAP SERPL CALC-SCNC: 9 MMOL/L (ref 0–18)
AST SERPL-CCNC: 19 U/L (ref 15–37)
BASOPHILS # BLD AUTO: 0.08 X10(3) UL (ref 0–0.2)
BASOPHILS NFR BLD AUTO: 1.2 %
BILIRUB SERPL-MCNC: 0.6 MG/DL (ref 0.1–2)
BUN BLD-MCNC: 22 MG/DL (ref 7–18)
CALCIUM BLD-MCNC: 10.4 MG/DL (ref 8.5–10.1)
CHLORIDE SERPL-SCNC: 108 MMOL/L (ref 98–112)
CHOLEST SERPL-MCNC: 375 MG/DL (ref ?–200)
CO2 SERPL-SCNC: 24 MMOL/L (ref 21–32)
CREAT BLD-MCNC: 0.87 MG/DL
CREAT UR-SCNC: 84.8 MG/DL
EOSINOPHIL # BLD AUTO: 0.27 X10(3) UL (ref 0–0.7)
EOSINOPHIL NFR BLD AUTO: 4 %
ERYTHROCYTE [DISTWIDTH] IN BLOOD BY AUTOMATED COUNT: 13.3 %
EST. AVERAGE GLUCOSE BLD GHB EST-MCNC: 120 MG/DL (ref 68–126)
FASTING PATIENT LIPID ANSWER: YES
FASTING STATUS PATIENT QL REPORTED: YES
GFR SERPLBLD BASED ON 1.73 SQ M-ARVRAT: 74 ML/MIN/1.73M2 (ref 60–?)
GLOBULIN PLAS-MCNC: 4.2 G/DL (ref 2.8–4.4)
GLUCOSE BLD-MCNC: 95 MG/DL (ref 70–99)
HBA1C MFR BLD: 5.8 % (ref ?–5.7)
HCT VFR BLD AUTO: 44.7 %
HDLC SERPL-MCNC: 65 MG/DL (ref 40–59)
HGB BLD-MCNC: 14.6 G/DL
IMM GRANULOCYTES # BLD AUTO: 0.01 X10(3) UL (ref 0–1)
IMM GRANULOCYTES NFR BLD: 0.1 %
LDLC SERPL CALC-MCNC: 255 MG/DL (ref ?–100)
LYMPHOCYTES # BLD AUTO: 3.18 X10(3) UL (ref 1–4)
LYMPHOCYTES NFR BLD AUTO: 47.7 %
MCH RBC QN AUTO: 29.3 PG (ref 26–34)
MCHC RBC AUTO-ENTMCNC: 32.7 G/DL (ref 31–37)
MCV RBC AUTO: 89.8 FL
MICROALBUMIN UR-MCNC: 30.3 MG/DL
MICROALBUMIN/CREAT 24H UR-RTO: 357.3 UG/MG (ref ?–30)
MONOCYTES # BLD AUTO: 0.57 X10(3) UL (ref 0.1–1)
MONOCYTES NFR BLD AUTO: 8.5 %
NEUTROPHILS # BLD AUTO: 2.56 X10 (3) UL (ref 1.5–7.7)
NEUTROPHILS # BLD AUTO: 2.56 X10(3) UL (ref 1.5–7.7)
NEUTROPHILS NFR BLD AUTO: 38.5 %
NONHDLC SERPL-MCNC: 310 MG/DL (ref ?–130)
OSMOLALITY SERPL CALC.SUM OF ELEC: 295 MOSM/KG (ref 275–295)
PLATELET # BLD AUTO: 258 10(3)UL (ref 150–450)
POTASSIUM SERPL-SCNC: 3.8 MMOL/L (ref 3.5–5.1)
PROT SERPL-MCNC: 8.2 G/DL (ref 6.4–8.2)
RBC # BLD AUTO: 4.98 X10(6)UL
SODIUM SERPL-SCNC: 141 MMOL/L (ref 136–145)
TRIGL SERPL-MCNC: 261 MG/DL (ref 30–149)
TSI SER-ACNC: 1.25 MIU/ML (ref 0.36–3.74)
VLDLC SERPL CALC-MCNC: 64 MG/DL (ref 0–30)
WBC # BLD AUTO: 6.7 X10(3) UL (ref 4–11)

## 2023-04-08 PROCEDURE — 82043 UR ALBUMIN QUANTITATIVE: CPT

## 2023-04-08 PROCEDURE — 82570 ASSAY OF URINE CREATININE: CPT

## 2023-04-08 PROCEDURE — 84443 ASSAY THYROID STIM HORMONE: CPT

## 2023-04-08 PROCEDURE — 3044F HG A1C LEVEL LT 7.0%: CPT | Performed by: INTERNAL MEDICINE

## 2023-04-08 PROCEDURE — 3060F POS MICROALBUMINURIA REV: CPT | Performed by: INTERNAL MEDICINE

## 2023-04-08 PROCEDURE — 80061 LIPID PANEL: CPT

## 2023-04-08 PROCEDURE — 80053 COMPREHEN METABOLIC PANEL: CPT

## 2023-04-08 PROCEDURE — 36415 COLL VENOUS BLD VENIPUNCTURE: CPT

## 2023-04-08 PROCEDURE — 83036 HEMOGLOBIN GLYCOSYLATED A1C: CPT

## 2023-04-08 PROCEDURE — 85025 COMPLETE CBC W/AUTO DIFF WBC: CPT

## 2023-04-10 DIAGNOSIS — I10 ESSENTIAL HYPERTENSION: ICD-10-CM

## 2023-04-10 RX ORDER — VALSARTAN 160 MG/1
160 TABLET ORAL DAILY
Qty: 90 TABLET | Refills: 0 | Status: SHIPPED | OUTPATIENT
Start: 2023-04-10

## 2023-04-10 RX ORDER — AMLODIPINE BESYLATE 2.5 MG/1
2.5 TABLET ORAL DAILY
Qty: 90 TABLET | Refills: 0 | Status: SHIPPED | OUTPATIENT
Start: 2023-04-10

## 2023-05-03 RX ORDER — CHOLECALCIFEROL (VITAMIN D3) 1250 MCG
CAPSULE ORAL
COMMUNITY
Start: 2022-03-02

## 2023-05-24 ENCOUNTER — OFFICE VISIT (OUTPATIENT)
Dept: NEPHROLOGY | Facility: CLINIC | Age: 68
End: 2023-05-24
Payer: COMMERCIAL

## 2023-05-24 VITALS — BODY MASS INDEX: 26 KG/M2 | DIASTOLIC BLOOD PRESSURE: 74 MMHG | SYSTOLIC BLOOD PRESSURE: 122 MMHG | WEIGHT: 126.38 LBS

## 2023-05-24 DIAGNOSIS — I10 PRIMARY HYPERTENSION: ICD-10-CM

## 2023-05-24 DIAGNOSIS — E78.5 DYSLIPIDEMIA: ICD-10-CM

## 2023-05-24 DIAGNOSIS — R80.9 PROTEINURIA, UNSPECIFIED TYPE: Primary | ICD-10-CM

## 2023-05-24 DIAGNOSIS — E11.9 TYPE 2 DIABETES MELLITUS WITHOUT COMPLICATION, WITHOUT LONG-TERM CURRENT USE OF INSULIN (HCC): ICD-10-CM

## 2023-05-24 PROCEDURE — 3078F DIAST BP <80 MM HG: CPT | Performed by: INTERNAL MEDICINE

## 2023-05-24 PROCEDURE — 3074F SYST BP LT 130 MM HG: CPT | Performed by: INTERNAL MEDICINE

## 2023-05-24 PROCEDURE — 99245 OFF/OP CONSLTJ NEW/EST HI 55: CPT | Performed by: INTERNAL MEDICINE

## 2023-05-25 ENCOUNTER — PATIENT MESSAGE (OUTPATIENT)
Dept: INTERNAL MEDICINE CLINIC | Facility: CLINIC | Age: 68
End: 2023-05-25

## 2023-05-25 NOTE — TELEPHONE ENCOUNTER
From: Jax To  To:  Silvia Vaughan MD  Sent: 5/25/2023 9:37 AM CDT  Subject: Rosuvastatin    Since my cholesterol is high, can I request my Rosuvastatin dose be increased to 10 mg?

## 2023-05-26 RX ORDER — ROSUVASTATIN CALCIUM 10 MG/1
10 TABLET, COATED ORAL NIGHTLY
Qty: 90 TABLET | Refills: 0 | Status: SHIPPED | OUTPATIENT
Start: 2023-05-26

## 2023-05-26 NOTE — TELEPHONE ENCOUNTER
See [facility] from patient - ok to take 2 of the 5mg dose and then increase to 10 mg? Pended refill for 10 mg of rosuvastatin. Patient admits to missing doses of medication in [facility].

## 2023-05-30 ENCOUNTER — PATIENT MESSAGE (OUTPATIENT)
Dept: INTERNAL MEDICINE CLINIC | Facility: CLINIC | Age: 68
End: 2023-05-30

## 2023-05-30 RX ORDER — ROSUVASTATIN CALCIUM 10 MG/1
10 TABLET, COATED ORAL NIGHTLY
Qty: 90 TABLET | Refills: 0 | Status: SHIPPED | OUTPATIENT
Start: 2023-05-30

## 2023-05-30 NOTE — TELEPHONE ENCOUNTER
From: Jax To  To: Silvia Vaughan MD  Sent: 5/30/2023 9:40 AM CDT  Subject: Prescription Refill    Dr. Baudilio Redding called in for a refill of my Rosuvastatin prescription and mistakenly sent it over to [facility]. I canceled that request. Please resubmit it and send the request to [address] [place] [address] (90 days). Thank you.

## 2023-07-08 ENCOUNTER — LAB ENCOUNTER (OUTPATIENT)
Dept: LAB | Age: 68
End: 2023-07-08
Attending: FAMILY MEDICINE
Payer: COMMERCIAL

## 2023-07-08 DIAGNOSIS — I10 PRIMARY HYPERTENSION: ICD-10-CM

## 2023-07-08 DIAGNOSIS — R80.9 PROTEINURIA, UNSPECIFIED TYPE: ICD-10-CM

## 2023-07-08 DIAGNOSIS — E11.9 TYPE 2 DIABETES MELLITUS WITHOUT COMPLICATION, WITHOUT LONG-TERM CURRENT USE OF INSULIN (HCC): ICD-10-CM

## 2023-07-08 DIAGNOSIS — E78.5 DYSLIPIDEMIA: ICD-10-CM

## 2023-07-08 LAB
BILIRUB UR QL STRIP.AUTO: NEGATIVE
CLARITY UR REFRACT.AUTO: CLEAR
COLOR UR AUTO: YELLOW
CREAT UR-SCNC: 107 MG/DL
GLUCOSE UR STRIP.AUTO-MCNC: NEGATIVE MG/DL
KETONES UR STRIP.AUTO-MCNC: NEGATIVE MG/DL
LEUKOCYTE ESTERASE UR QL STRIP.AUTO: NEGATIVE
MICROALBUMIN UR-MCNC: 15.1 MG/DL
MICROALBUMIN/CREAT 24H UR-RTO: 141.1 UG/MG (ref ?–30)
NITRITE UR QL STRIP.AUTO: NEGATIVE
PH UR STRIP.AUTO: 6 [PH] (ref 5–8)
PROT UR STRIP.AUTO-MCNC: 30 MG/DL
RBC UR QL AUTO: NEGATIVE
SP GR UR STRIP.AUTO: 1.01 (ref 1–1.03)
UROBILINOGEN UR STRIP.AUTO-MCNC: <2 MG/DL

## 2023-07-08 PROCEDURE — 82570 ASSAY OF URINE CREATININE: CPT

## 2023-07-08 PROCEDURE — 82043 UR ALBUMIN QUANTITATIVE: CPT

## 2023-07-08 PROCEDURE — 81001 URINALYSIS AUTO W/SCOPE: CPT

## 2023-07-08 PROCEDURE — 3061F NEG MICROALBUMINURIA REV: CPT | Performed by: FAMILY MEDICINE

## 2023-07-08 PROCEDURE — 3060F POS MICROALBUMINURIA REV: CPT | Performed by: FAMILY MEDICINE

## 2023-07-11 DIAGNOSIS — I10 ESSENTIAL HYPERTENSION: ICD-10-CM

## 2023-07-11 RX ORDER — VALSARTAN 160 MG/1
160 TABLET ORAL DAILY
Qty: 90 TABLET | Refills: 0 | Status: SHIPPED | OUTPATIENT
Start: 2023-07-11

## 2023-07-11 RX ORDER — AMLODIPINE BESYLATE 2.5 MG/1
2.5 TABLET ORAL DAILY
Qty: 90 TABLET | Refills: 0 | Status: SHIPPED | OUTPATIENT
Start: 2023-07-11

## 2023-07-11 NOTE — TELEPHONE ENCOUNTER
Amlodipine 2.5 mg  Filled 4-10-23  Qty 90  0 refills  Future Appointments   Date Time Provider [facility]   7/15/2023 11:00 AM THERESE Desai [facility] [place]   LOV 4-7-23 TO    Valsartan 160 mg  Filled 4-10-23  Qty 90  0 refills  Future Appointments   Date Time Provider [facility]   7/15/2023 11:00 AM THERESE Desai [facility] [place]   LOV 4-7-23 TO

## 2023-07-15 ENCOUNTER — OFFICE VISIT (OUTPATIENT)
Dept: INTERNAL MEDICINE CLINIC | Facility: CLINIC | Age: 68
End: 2023-07-15
Payer: COMMERCIAL

## 2023-07-15 VITALS
WEIGHT: 126.63 LBS | HEIGHT: 59 IN | HEART RATE: 78 BPM | TEMPERATURE: 99 F | DIASTOLIC BLOOD PRESSURE: 80 MMHG | BODY MASS INDEX: 25.53 KG/M2 | OXYGEN SATURATION: 99 % | SYSTOLIC BLOOD PRESSURE: 124 MMHG

## 2023-07-15 DIAGNOSIS — M79.671 PAIN IN BOTH FEET: ICD-10-CM

## 2023-07-15 DIAGNOSIS — Z01.419 ENCOUNTER FOR GYNECOLOGICAL EXAMINATION WITH PAPANICOLAOU SMEAR OF CERVIX: ICD-10-CM

## 2023-07-15 DIAGNOSIS — Z12.11 ENCOUNTER FOR SCREENING COLONOSCOPY: ICD-10-CM

## 2023-07-15 DIAGNOSIS — Z00.00 ROUTINE GENERAL MEDICAL EXAMINATION AT A HEALTH CARE FACILITY: Primary | ICD-10-CM

## 2023-07-15 DIAGNOSIS — M79.672 PAIN IN BOTH FEET: ICD-10-CM

## 2023-07-15 PROCEDURE — 3074F SYST BP LT 130 MM HG: CPT | Performed by: FAMILY MEDICINE

## 2023-07-15 PROCEDURE — 3008F BODY MASS INDEX DOCD: CPT | Performed by: FAMILY MEDICINE

## 2023-07-15 PROCEDURE — 99397 PER PM REEVAL EST PAT 65+ YR: CPT | Performed by: FAMILY MEDICINE

## 2023-07-15 PROCEDURE — 3079F DIAST BP 80-89 MM HG: CPT | Performed by: FAMILY MEDICINE

## 2023-07-27 LAB — HPV I/H RISK 1 DNA SPEC QL NAA+PROBE: NEGATIVE

## 2023-09-18 ENCOUNTER — PATIENT MESSAGE (OUTPATIENT)
Dept: INTERNAL MEDICINE CLINIC | Facility: CLINIC | Age: 68
End: 2023-09-18

## 2023-09-19 NOTE — TELEPHONE ENCOUNTER
From: Samantha Meyer  To: Carson Arce  Sent: 9/18/2023 7:56 PM CDT  Subject: Visit    Is Flu shot not available in your office?

## 2023-10-16 ENCOUNTER — PATIENT MESSAGE (OUTPATIENT)
Dept: INTERNAL MEDICINE CLINIC | Facility: CLINIC | Age: 68
End: 2023-10-16

## 2023-10-17 NOTE — TELEPHONE ENCOUNTER
From: Pop Escamilla  To: Edilberto Rivera  Sent: 10/16/2023 6:16 PM CDT  Subject: COVID-19 VACCINES    Please delete the reminder messages on COVID-19.  This was completed on Oct 7, 2023, at [facility].

## 2023-10-19 DIAGNOSIS — I10 ESSENTIAL HYPERTENSION: ICD-10-CM

## 2023-10-19 RX ORDER — VALSARTAN 160 MG/1
160 TABLET ORAL DAILY
Qty: 90 TABLET | Refills: 0 | Status: SHIPPED | OUTPATIENT
Start: 2023-10-19

## 2023-10-19 RX ORDER — ROSUVASTATIN CALCIUM 10 MG/1
10 TABLET, COATED ORAL NIGHTLY
Qty: 90 TABLET | Refills: 0 | Status: SHIPPED | OUTPATIENT
Start: 2023-10-19

## 2023-10-19 RX ORDER — AMLODIPINE BESYLATE 2.5 MG/1
2.5 TABLET ORAL DAILY
Qty: 90 TABLET | Refills: 0 | Status: SHIPPED | OUTPATIENT
Start: 2023-10-19

## 2023-10-19 NOTE — TELEPHONE ENCOUNTER
Requesting    rosuvastatin 10 MG Oral Tab         Sig: Take 1 tablet (10 mg total) by mouth nightly. Disp: 90 tablet    Refills: 0    Start: 10/19/2023    Class: Normal    Non-formulary    Last ordered: 4 months ago (5/30/2023) by Edilberto Rivera MD    Cholesterol Medication Protocol Nviblf77/19/2023 03:44 PM   Protocol Details ALT < 80    ALT resulted within past year    Lipid panel within past 12 months    Appointment within past 12 or next 3 months       valsartan 160 MG Oral Tab         Sig: Take 1 tablet (160 mg total) by mouth daily. Disp: 90 tablet    Refills: 0    Start: 10/19/2023    Class: Normal    Non-formulary    Last ordered: 3 months ago (7/11/2023) by Edilberto Rivera MD    Hypertension Medications Protocol Fcvijb24/19/2023 03:44 PM   Protocol Details CMP or BMP in past 12 months    Last serum creatinine< 2.0    Appointment in past 6 or next 3 months       amLODIPine 2.5 MG Oral Tab         Sig: Take 1 tablet (2.5 mg total) by mouth daily. Disp: 90 tablet    Refills: 0    Start: 10/19/2023    Class: Normal    Non-formulary For: Essential hypertension    Last ordered: 3 months ago (7/11/2023) by Edilberto Rivera MD    Hypertension Medications Protocol Vmidnf13/19/2023 03:44 PM   Protocol Details CMP or BMP in past 12 months    Last serum creatinine< 2.0    Appointment in past 6 or next 3 months        LOV: 7/15/2023  RTC: 1 year   Last Relevant Labs: 4/8/2023  Filled:   Valsartan-7/11/2023 #90 with 0 refills  Rosuvastatin-5/30/2023 #90 with 0 refills  Amlodipine-7/11/2023 #90 with 0 refills  No future appointments.

## 2023-12-05 ENCOUNTER — PATIENT MESSAGE (OUTPATIENT)
Dept: INTERNAL MEDICINE CLINIC | Facility: CLINIC | Age: 68
End: 2023-12-05

## 2023-12-05 NOTE — TELEPHONE ENCOUNTER
From: Channing Quach  To: Clara Lundy  Sent: 12/5/2023 9:45 AM CST  Subject: Amlodipine & Rosuvastatin taking at same time    Additional side effects Liver Damage.

## 2023-12-19 ENCOUNTER — PATIENT MESSAGE (OUTPATIENT)
Dept: INTERNAL MEDICINE CLINIC | Facility: CLINIC | Age: 68
End: 2023-12-19

## 2023-12-19 NOTE — TELEPHONE ENCOUNTER
From: Martha Oscar  To: Dalia Hernández  Sent: 12/19/2023 11:52 AM CST  Subject: COVID    Please remove the reminder. Already completed. Check your records. Thank you.

## 2023-12-19 NOTE — TELEPHONE ENCOUNTER
From: Tabitha Goode  To: Jj Dubon  Sent: 12/19/2023 11:47 AM CST  Subject: Colonoscopy    Please remove the reminder. The procedure was completed at [facility] on Dec. 14 (attached). Please include this information in my records. Thank you.

## 2023-12-20 NOTE — TELEPHONE ENCOUNTER
Received patients results     Epic updated and paperwork placed in TO in basket for review       updated to continue every 5 years--if different please change

## 2023-12-21 ENCOUNTER — TELEPHONE (OUTPATIENT)
Dept: INTERNAL MEDICINE CLINIC | Facility: CLINIC | Age: 68
End: 2023-12-21

## 2023-12-21 DIAGNOSIS — Z12.31 SCREENING MAMMOGRAM, ENCOUNTER FOR: Primary | ICD-10-CM

## 2024-02-24 ENCOUNTER — HOSPITAL ENCOUNTER (OUTPATIENT)
Dept: MAMMOGRAPHY | Age: 69
Discharge: HOME | End: 2024-02-24
Attending: FAMILY MEDICINE
Payer: COMMERCIAL

## 2024-02-24 DIAGNOSIS — Z12.31 SCREENING MAMMOGRAM, ENCOUNTER FOR: ICD-10-CM

## 2024-02-24 PROCEDURE — 77067 SCR MAMMO BI INCL CAD: CPT | Performed by: FAMILY MEDICINE

## 2024-02-24 PROCEDURE — 77063 BREAST TOMOSYNTHESIS BI: CPT | Performed by: FAMILY MEDICINE

## 2024-04-08 ENCOUNTER — PATIENT MESSAGE (OUTPATIENT)
Dept: INTERNAL MEDICINE CLINIC | Facility: CLINIC | Age: 69
End: 2024-04-08

## 2024-04-09 NOTE — TELEPHONE ENCOUNTER
From: Zhane Frederick  To: Irina Calix  Sent: 4/8/2024 4:31 PM CDT  Subject: Preventive Care    In my chart, I have orders to do [facility] Microalbumin/Creatinine Ratio and [facility] GFR. Why are these separate tests? I have high co-pays so if possible I want to have a combined test.

## 2024-04-24 ENCOUNTER — TELEPHONE (OUTPATIENT)
Dept: INTERNAL MEDICINE CLINIC | Facility: CLINIC | Age: 69
End: 2024-04-24

## 2024-04-24 DIAGNOSIS — E11.9 TYPE 2 DIABETES MELLITUS WITHOUT COMPLICATION, WITHOUT LONG-TERM CURRENT USE OF INSULIN (HCC): ICD-10-CM

## 2024-04-24 DIAGNOSIS — E55.9 VITAMIN D DEFICIENCY: ICD-10-CM

## 2024-04-24 DIAGNOSIS — Z00.00 LABORATORY EXAMINATION ORDERED AS PART OF A ROUTINE GENERAL MEDICAL EXAMINATION: Primary | ICD-10-CM

## 2024-04-24 DIAGNOSIS — I71.40 ABDOMINAL AORTIC ANEURYSM (AAA) WITHOUT RUPTURE, UNSPECIFIED PART (HCC): ICD-10-CM

## 2024-04-24 NOTE — TELEPHONE ENCOUNTER
Please schedule lab work first then I will schedule an office visit so there is something the doctor and I could talk about.     Please include MICROALB/CREAT RATIO, RANDOM URINE, Liver Function Test, Glucouse testing.  Am I due for Abdominal Aortic Aneurysm exam?

## 2024-04-29 DIAGNOSIS — I10 ESSENTIAL HYPERTENSION: ICD-10-CM

## 2024-04-29 RX ORDER — VALSARTAN 160 MG/1
160 TABLET ORAL DAILY
Qty: 90 TABLET | Refills: 0 | Status: SHIPPED | OUTPATIENT
Start: 2024-04-29

## 2024-04-29 RX ORDER — ROSUVASTATIN CALCIUM 10 MG/1
10 TABLET, COATED ORAL NIGHTLY
Qty: 90 TABLET | Refills: 0 | Status: SHIPPED | OUTPATIENT
Start: 2024-04-29

## 2024-04-29 RX ORDER — AMLODIPINE BESYLATE 2.5 MG/1
2.5 TABLET ORAL DAILY
Qty: 90 TABLET | Refills: 0 | Status: SHIPPED | OUTPATIENT
Start: 2024-04-29

## 2024-04-29 NOTE — TELEPHONE ENCOUNTER
Requesting    valsartan 160 MG Oral Tab         Sig: Take 1 tablet (160 mg total) by mouth daily.    Disp: 90 tablet    Refills: 0    Start: 4/29/2024    Class: Normal    Non-formulary    Last ordered: 5 months ago (11/2/2023) by Iirna Calix MD    Patient comment: please note 90 days    Hypertension Medications Protocol Dzafxp0404/29/2024 03:15 PM   Protocol Details CMP or BMP in past 12 months    EGFRCR or GFRNAA > 50    Last BP reading less than 140/90    In person appointment or virtual visit in the past 12 mos or appointment in next 3 mos       rosuvastatin 10 MG Oral Tab         Sig: Take 1 tablet (10 mg total) by mouth nightly.    Disp: 90 tablet    Refills: 0    Start: 4/29/2024    Class: Normal    Non-formulary    Last ordered: 5 months ago (11/2/2023) by Irina Calix MD    Patient comment: please note 90 days    Cholesterol Medication Protocol Apxrnw4104/29/2024 03:15 PM   Protocol Details ALT < 80    ALT resulted within past year    Lipid panel within past 12 months    In person appointment or virtual visit in the past 12 mos or appointment in next 3 mos       amLODIPine 2.5 MG Oral Tab         Sig: Take 1 tablet (2.5 mg total) by mouth daily.    Disp: 90 tablet    Refills: 0    Start: 4/29/2024    Class: Normal    Non-formulary For: Essential hypertension    Last ordered: 5 months ago (11/2/2023) by Irina Calix MD    Patient comment: please note 90 days    Hypertension Medications Protocol Tbrjjo6504/29/2024 03:15 PM   Protocol Details CMP or BMP in past 12 months    EGFRCR or GFRNAA > 50    Last BP reading less than 140/90    In person appointment or virtual visit in the past 12 mos or appointment in next 3 mos      To be filled at: None [Patient requested: [address] [place] [address]  Phone:  [phone]]     LOV: 7/15/2023  RTC: 6 months   Last Relevant Labs: 4/8/2023  Filled: 11/2/202 #90 with 1 refills    Future Appointments   Date Time Provider Department Center   5/4/2024  9:00  AM Irina Calix MD [facility] [place]   [facility]24 [facility] [place]   6/4/2024  9:30 AM [facility]  [place]

## 2024-05-04 ENCOUNTER — OFFICE VISIT (OUTPATIENT)
Dept: INTERNAL MEDICINE CLINIC | Facility: CLINIC | Age: 69
End: 2024-05-04
Payer: COMMERCIAL

## 2024-05-04 VITALS
BODY MASS INDEX: 26.54 KG/M2 | DIASTOLIC BLOOD PRESSURE: 80 MMHG | HEART RATE: 72 BPM | HEIGHT: 59 IN | SYSTOLIC BLOOD PRESSURE: 128 MMHG | WEIGHT: 131.63 LBS | TEMPERATURE: 97 F | RESPIRATION RATE: 16 BRPM | OXYGEN SATURATION: 97 %

## 2024-05-04 DIAGNOSIS — E11.9 TYPE 2 DIABETES MELLITUS WITHOUT COMPLICATION, WITHOUT LONG-TERM CURRENT USE OF INSULIN (HCC): Primary | ICD-10-CM

## 2024-05-04 DIAGNOSIS — E78.00 PURE HYPERCHOLESTEROLEMIA: ICD-10-CM

## 2024-05-04 DIAGNOSIS — R10.84 GENERALIZED ABDOMINAL PAIN: ICD-10-CM

## 2024-05-04 DIAGNOSIS — R42 DIZZINESS: ICD-10-CM

## 2024-05-04 DIAGNOSIS — M25.50 POLYARTHRALGIA: ICD-10-CM

## 2024-05-04 DIAGNOSIS — E55.9 VITAMIN D DEFICIENCY: ICD-10-CM

## 2024-05-04 DIAGNOSIS — I10 ESSENTIAL HYPERTENSION: ICD-10-CM

## 2024-05-04 PROCEDURE — 3074F SYST BP LT 130 MM HG: CPT | Performed by: FAMILY MEDICINE

## 2024-05-04 PROCEDURE — 3079F DIAST BP 80-89 MM HG: CPT | Performed by: FAMILY MEDICINE

## 2024-05-04 PROCEDURE — 99215 OFFICE O/P EST HI 40 MIN: CPT | Performed by: FAMILY MEDICINE

## 2024-05-04 PROCEDURE — 3008F BODY MASS INDEX DOCD: CPT | Performed by: FAMILY MEDICINE

## 2024-05-04 NOTE — PROGRESS NOTES
Zhane Frederick is a 68 year old female.  HPI:   Here for f/u on the meds and BP    Takes as directed   no CP  breathing is good    /70s in general    Is on meds as directed     no issues w meds   Breathing is good   Worried about left sided CP w laying     Usually lays on the L side    not during the day   not exertional   Did have general abd pain that woke her up few weeks ago    It seemed to go away in 15 min     no NVD w it    did drink some water and may have helped     Does occ get dizzy episodes w certain motions    no allergy issues aware of   no meds used for it       vision OK    does get this in the miidle of the night at times when gets up as well     Does have aches in several joints, hands   both knees replaced     shoulders as well   upper back     going on several yrs      Current Outpatient Medications   Medication Sig Dispense Refill    valsartan 160 MG Oral Tab Take 1 tablet (160 mg total) by mouth daily. 90 tablet 0    rosuvastatin 10 MG Oral Tab Take 1 tablet (10 mg total) by mouth nightly. 90 tablet 0    amLODIPine 2.5 MG Oral Tab Take 1 tablet (2.5 mg total) by mouth daily. 90 tablet 0      Past Medical History:    Allergic rhinitis    Arthritis    Benign neoplasm of colon    Essential hypertension    Controlled by medications    Hyperlipidemia    LDL elevated    Osteoarthritis    Had bilateral TKR 2012 & 2018    Pure hypercholesterolemia      Social History:  Social History     Socioeconomic History    Marital status:    Tobacco Use    Smoking status: Never    Smokeless tobacco: Never   Vaping Use    Vaping status: Never Used   Substance and Sexual Activity    Alcohol use: No     Alcohol/week: 0.0 standard drinks of alcohol    Drug use: No    Sexual activity: Never   Other Topics Concern    Caffeine Concern Yes     Comment: 1-2 cups of coffee daily.     Stress Concern Yes    Weight Concern No    Special Diet Yes    Exercise No    Seat Belt Yes     Social Determinants of Health       [facility]    Housing Stability        REVIEW OF SYSTEMS:   GENERAL HEALTH: feels well otherwise  SKIN: denies rashes  RESPIRATORY: denies shortness    CARDIOVASCULAR: denies chest pain   GI: denies abdominal pain  NEURO: denies headaches    EXAM:   /80   Pulse 72   Temp 97.3 °F (36.3 °C) (Temporal)   Resp 16   Ht 4' 11\" (1.499 m)   Wt 131 lb 9.6 oz (59.7 kg)   SpO2 97%   BMI 26.58 kg/m²   GENERAL: well developed, well nourished,in no apparent distress  SKIN: no rashes  TMs    L w fluid    NECK: supple,no adenopathy  LUNGS: clear to auscultation  CARDIO: RRR without murmur  EXTREMITIES: Bilateral barefoot skin diabetic exam is normal, visualized feet and the appearance is normal.  Bilateral monofilament/sensation of both feet is normal.  Pulsation pedal pulse exam of both lower legs/feet is normal as well.        ASSESSMENT AND PLAN:   1. Type 2 diabetes mellitus without complication, without long-term current use of insulin (HCC)  Await labs     rec eye check this summer       2. Pure hypercholesterolemia  States on crestor regularly now    await labs     3. Essential hypertension  Stable w meds   CPM      4. Vitamin D deficiency  Check labs      5. Generalized abdominal pain  Sounds like a cramp that is now resolved      6. Dizziness  Could be ear related    seems OK now     7.   Polyarthralgia      check labs       The patient indicates understanding of these issues and agrees to the plan.  .

## 2024-05-30 ENCOUNTER — LAB ENCOUNTER (OUTPATIENT)
Dept: LAB | Age: 69
End: 2024-05-30
Attending: FAMILY MEDICINE
Payer: COMMERCIAL

## 2024-05-30 DIAGNOSIS — E11.9 TYPE 2 DIABETES MELLITUS WITHOUT COMPLICATION, WITHOUT LONG-TERM CURRENT USE OF INSULIN (HCC): ICD-10-CM

## 2024-05-30 DIAGNOSIS — Z00.00 LABORATORY EXAMINATION ORDERED AS PART OF A ROUTINE GENERAL MEDICAL EXAMINATION: ICD-10-CM

## 2024-05-30 DIAGNOSIS — E55.9 VITAMIN D DEFICIENCY: ICD-10-CM

## 2024-05-30 DIAGNOSIS — M25.50 POLYARTHRALGIA: ICD-10-CM

## 2024-05-30 LAB
ALBUMIN SERPL-MCNC: 4.4 G/DL (ref 3.2–4.8)
ALBUMIN/GLOB SERPL: 1.3 {RATIO} (ref 1–2)
ALP LIVER SERPL-CCNC: 85 U/L
ALT SERPL-CCNC: 23 U/L
ANION GAP SERPL CALC-SCNC: 7 MMOL/L (ref 0–18)
AST SERPL-CCNC: 21 U/L (ref ?–34)
BASOPHILS # BLD AUTO: 0.05 X10(3) UL (ref 0–0.2)
BASOPHILS NFR BLD AUTO: 0.8 %
BILIRUB SERPL-MCNC: 0.7 MG/DL (ref 0.2–1.1)
BUN BLD-MCNC: 24 MG/DL (ref 9–23)
BUN/CREAT SERPL: 27.9 (ref 10–20)
CALCIUM BLD-MCNC: 10.2 MG/DL (ref 8.7–10.4)
CHLORIDE SERPL-SCNC: 109 MMOL/L (ref 98–112)
CHOLEST SERPL-MCNC: 221 MG/DL (ref ?–200)
CO2 SERPL-SCNC: 27 MMOL/L (ref 21–32)
CREAT BLD-MCNC: 0.86 MG/DL
CREAT UR-SCNC: 69.2 MG/DL
CRP SERPL-MCNC: <0.4 MG/DL (ref ?–1)
DEPRECATED RDW RBC AUTO: 42.5 FL (ref 35.1–46.3)
EGFRCR SERPLBLD CKD-EPI 2021: 74 ML/MIN/1.73M2 (ref 60–?)
EOSINOPHIL # BLD AUTO: 0.24 X10(3) UL (ref 0–0.7)
EOSINOPHIL NFR BLD AUTO: 3.6 %
ERYTHROCYTE [DISTWIDTH] IN BLOOD BY AUTOMATED COUNT: 13.1 % (ref 11–15)
ERYTHROCYTE [SEDIMENTATION RATE] IN BLOOD: 63 MM/HR
EST. AVERAGE GLUCOSE BLD GHB EST-MCNC: 120 MG/DL (ref 68–126)
FASTING PATIENT LIPID ANSWER: YES
FASTING STATUS PATIENT QL REPORTED: YES
GLOBULIN PLAS-MCNC: 3.3 G/DL (ref 2–3.5)
GLUCOSE BLD-MCNC: 88 MG/DL (ref 70–99)
HBA1C MFR BLD: 5.8 % (ref ?–5.7)
HCT VFR BLD AUTO: 41.7 %
HDLC SERPL-MCNC: 49 MG/DL (ref 40–59)
HGB BLD-MCNC: 14.2 G/DL
IMM GRANULOCYTES # BLD AUTO: 0.02 X10(3) UL (ref 0–1)
IMM GRANULOCYTES NFR BLD: 0.3 %
LDLC SERPL CALC-MCNC: 136 MG/DL (ref ?–100)
LYMPHOCYTES # BLD AUTO: 2.63 X10(3) UL (ref 1–4)
LYMPHOCYTES NFR BLD AUTO: 40 %
MCH RBC QN AUTO: 30.1 PG (ref 26–34)
MCHC RBC AUTO-ENTMCNC: 34.1 G/DL (ref 31–37)
MCV RBC AUTO: 88.5 FL
MICROALBUMIN UR-MCNC: 7.5 MG/DL
MICROALBUMIN/CREAT 24H UR-RTO: 108.4 UG/MG (ref ?–30)
MONOCYTES # BLD AUTO: 0.6 X10(3) UL (ref 0.1–1)
MONOCYTES NFR BLD AUTO: 9.1 %
NEUTROPHILS # BLD AUTO: 3.04 X10 (3) UL (ref 1.5–7.7)
NEUTROPHILS # BLD AUTO: 3.04 X10(3) UL (ref 1.5–7.7)
NEUTROPHILS NFR BLD AUTO: 46.2 %
NONHDLC SERPL-MCNC: 172 MG/DL (ref ?–130)
OSMOLALITY SERPL CALC.SUM OF ELEC: 299 MOSM/KG (ref 275–295)
PLATELET # BLD AUTO: 207 10(3)UL (ref 150–450)
POTASSIUM SERPL-SCNC: 3.9 MMOL/L (ref 3.5–5.1)
PROT SERPL-MCNC: 7.7 G/DL (ref 5.7–8.2)
RBC # BLD AUTO: 4.71 X10(6)UL
RHEUMATOID FACT SERPL-ACNC: <3.5 IU/ML (ref ?–14)
SODIUM SERPL-SCNC: 143 MMOL/L (ref 136–145)
TRIGL SERPL-MCNC: 201 MG/DL (ref 30–149)
TSI SER-ACNC: 1.46 MIU/ML (ref 0.55–4.78)
VIT D+METAB SERPL-MCNC: 73.5 NG/ML (ref 30–100)
VLDLC SERPL CALC-MCNC: 37 MG/DL (ref 0–30)
WBC # BLD AUTO: 6.6 X10(3) UL (ref 4–11)

## 2024-05-30 PROCEDURE — 85652 RBC SED RATE AUTOMATED: CPT

## 2024-05-30 PROCEDURE — 85025 COMPLETE CBC W/AUTO DIFF WBC: CPT

## 2024-05-30 PROCEDURE — 80053 COMPREHEN METABOLIC PANEL: CPT | Performed by: FAMILY MEDICINE

## 2024-05-30 PROCEDURE — 86038 ANTINUCLEAR ANTIBODIES: CPT

## 2024-05-30 PROCEDURE — 83036 HEMOGLOBIN GLYCOSYLATED A1C: CPT | Performed by: FAMILY MEDICINE

## 2024-05-30 PROCEDURE — 86431 RHEUMATOID FACTOR QUANT: CPT

## 2024-05-30 PROCEDURE — 36415 COLL VENOUS BLD VENIPUNCTURE: CPT

## 2024-05-30 PROCEDURE — 82306 VITAMIN D 25 HYDROXY: CPT

## 2024-05-30 PROCEDURE — 82570 ASSAY OF URINE CREATININE: CPT

## 2024-05-30 PROCEDURE — 82043 UR ALBUMIN QUANTITATIVE: CPT

## 2024-05-30 PROCEDURE — 84443 ASSAY THYROID STIM HORMONE: CPT

## 2024-05-30 PROCEDURE — 86140 C-REACTIVE PROTEIN: CPT

## 2024-05-30 PROCEDURE — 80061 LIPID PANEL: CPT | Performed by: FAMILY MEDICINE

## 2024-05-31 ENCOUNTER — TELEPHONE (OUTPATIENT)
Dept: INTERNAL MEDICINE CLINIC | Facility: CLINIC | Age: 69
End: 2024-05-31

## 2024-05-31 NOTE — TELEPHONE ENCOUNTER
Incoming fax from [facility]     Patients Diabetic eye exam done on 5/30/2024 by     No Retinopathy    Flowsheets updated     Placed in TO in basket for review

## 2024-06-03 ENCOUNTER — PATIENT MESSAGE (OUTPATIENT)
Dept: INTERNAL MEDICINE CLINIC | Facility: CLINIC | Age: 69
End: 2024-06-03

## 2024-06-03 DIAGNOSIS — E78.00 PURE HYPERCHOLESTEROLEMIA: ICD-10-CM

## 2024-06-03 DIAGNOSIS — R52 BODY ACHES: Primary | ICD-10-CM

## 2024-06-03 DIAGNOSIS — E11.9 TYPE 2 DIABETES MELLITUS WITHOUT COMPLICATION, WITHOUT LONG-TERM CURRENT USE OF INSULIN (HCC): ICD-10-CM

## 2024-06-04 ENCOUNTER — HOSPITAL ENCOUNTER (OUTPATIENT)
Dept: ULTRASOUND IMAGING | Age: 69
Discharge: HOME | End: 2024-06-04
Attending: FAMILY MEDICINE
Payer: COMMERCIAL

## 2024-06-04 DIAGNOSIS — I71.40 ABDOMINAL AORTIC ANEURYSM (AAA) WITHOUT RUPTURE, UNSPECIFIED PART (HCC): ICD-10-CM

## 2024-06-04 LAB — NUCLEAR IGG TITR SER IF: POSITIVE {TITER}

## 2024-06-04 PROCEDURE — 76770 US EXAM ABDO BACK WALL COMP: CPT | Performed by: FAMILY MEDICINE

## 2024-06-04 NOTE — TELEPHONE ENCOUNTER
From: Zhane Frederick  To: Harriett Gould  Sent: 6/3/2024 5:51 PM CDT  Subject: EYE EXAM    Please add this documentation to my file.   Annual check for diabetic retinopathy.  So far all is good except for Cataract.

## 2024-06-06 LAB — ANA NUCLEOLAR TITR SER IF: 320 {TITER}

## 2024-06-06 NOTE — TELEPHONE ENCOUNTER
From: Zhane Frederick  To: Harriett Gould  Sent: 6/3/2024 5:54 PM CDT  Subject: TEST Results    Please provide more detail to this comment (in Layman's terms).    Your C-reactive protein and Rheumatoid factor are negative. Your Sed rate is elevated which indicates non-specific inflammation in the body. Your TORI test is still pending.    THERESE Cedeño, 05/31/24, 1:42 PM    What is an TORI test and why is it still pending?  
Irina Calix MD  6/4/2024  7:45 PM CDT Back to Top      +TORI    rec see rheum  Dr Ramirez, APRN  5/30/2024  5:39 PM CDT Back to Top      Micro albumin improving, continue to keep the diabetes and BP under control. Recheck in 6 months.  CBC,TSH, AND VIT D all wnl.     Harriett Gould, APRDEUCE  5/30/2024  5:41 PM CDT Back to Top      Hgba1c stable at 5.8. Please continue to eat a low carb diet and exercise.  Lipids improving, continue to take the cholesterol medication and eat a low carb diet. Recheck in 6 months.  CMP stable.     
not examined

## 2024-06-13 ENCOUNTER — TELEPHONE (OUTPATIENT)
Dept: RHEUMATOLOGY | Facility: CLINIC | Age: 69
End: 2024-06-13

## 2024-07-17 ENCOUNTER — OFFICE VISIT (OUTPATIENT)
Dept: INTERNAL MEDICINE CLINIC | Facility: CLINIC | Age: 69
End: 2024-07-17
Payer: COMMERCIAL

## 2024-07-17 VITALS
BODY MASS INDEX: 26.98 KG/M2 | OXYGEN SATURATION: 98 % | SYSTOLIC BLOOD PRESSURE: 138 MMHG | DIASTOLIC BLOOD PRESSURE: 88 MMHG | TEMPERATURE: 98 F | HEIGHT: 59 IN | HEART RATE: 78 BPM | WEIGHT: 133.81 LBS

## 2024-07-17 DIAGNOSIS — K43.9 HERNIA OF ABDOMINAL WALL: ICD-10-CM

## 2024-07-17 DIAGNOSIS — I10 ESSENTIAL HYPERTENSION: ICD-10-CM

## 2024-07-17 DIAGNOSIS — R41.89 BRAIN FOG: ICD-10-CM

## 2024-07-17 DIAGNOSIS — E78.00 PURE HYPERCHOLESTEROLEMIA: ICD-10-CM

## 2024-07-17 DIAGNOSIS — R76.8 POSITIVE ANA (ANTINUCLEAR ANTIBODY): ICD-10-CM

## 2024-07-17 DIAGNOSIS — L03.032 CELLULITIS OF SECOND TOE OF LEFT FOOT: Primary | ICD-10-CM

## 2024-07-17 DIAGNOSIS — I71.40 ABDOMINAL AORTIC ANEURYSM (AAA) WITHOUT RUPTURE, UNSPECIFIED PART (HCC): ICD-10-CM

## 2024-07-17 PROCEDURE — 3008F BODY MASS INDEX DOCD: CPT | Performed by: FAMILY MEDICINE

## 2024-07-17 PROCEDURE — 3079F DIAST BP 80-89 MM HG: CPT | Performed by: FAMILY MEDICINE

## 2024-07-17 PROCEDURE — 3075F SYST BP GE 130 - 139MM HG: CPT | Performed by: FAMILY MEDICINE

## 2024-07-17 PROCEDURE — 3060F POS MICROALBUMINURIA REV: CPT | Performed by: FAMILY MEDICINE

## 2024-07-17 PROCEDURE — 99215 OFFICE O/P EST HI 40 MIN: CPT | Performed by: FAMILY MEDICINE

## 2024-07-17 PROCEDURE — 3044F HG A1C LEVEL LT 7.0%: CPT | Performed by: FAMILY MEDICINE

## 2024-07-17 PROCEDURE — 3061F NEG MICROALBUMINURIA REV: CPT | Performed by: FAMILY MEDICINE

## 2024-07-17 RX ORDER — CEPHALEXIN 500 MG/1
500 CAPSULE ORAL 3 TIMES DAILY
Qty: 21 CAPSULE | Refills: 0 | Status: SHIPPED | OUTPATIENT
Start: 2024-07-17

## 2024-07-17 NOTE — PROGRESS NOTES
Zhane Frederick is a 69 year old female.  HPI:   Here to go over a few issues      Check L #2 toe   started to swell 2 d ago  no injury    Does use a silicone spacer in the area   no blister   slight red as well       no h/o gout    Also has abd ache in the lower sternal area      going on for months    no injury    is off on   does slouch     no swelling    no redness or swelling      worried about her aorta      Had eye check in May     Has rheum in Oct for the TORI    Asking about the crestor      feels it may be causing her fogginess    read about it     Worried about son and his depression    she made appt for him in 3 days w me        Current Outpatient Medications   Medication Sig Dispense Refill    valsartan 160 MG Oral Tab Take 1 tablet (160 mg total) by mouth daily. 90 tablet 0    rosuvastatin 10 MG Oral Tab Take 1 tablet (10 mg total) by mouth nightly. 90 tablet 0    amLODIPine 2.5 MG Oral Tab Take 1 tablet (2.5 mg total) by mouth daily. 90 tablet 0      Past Medical History:    Allergic rhinitis    Arthritis    Benign neoplasm of colon    Essential hypertension    Controlled by medications    Hyperlipidemia    LDL elevated    Osteoarthritis    Had bilateral TKR 2012 & 2018    Pure hypercholesterolemia      Social History:  Social History     Socioeconomic History    Marital status:    Tobacco Use    Smoking status: Never    Smokeless tobacco: Never   Vaping Use    Vaping status: Never Used   Substance and Sexual Activity    Alcohol use: No     Alcohol/week: 0.0 standard drinks of alcohol    Drug use: No    Sexual activity: Never   Other Topics Concern    Caffeine Concern Yes     Comment: 1-2 cups of coffee daily.     Stress Concern Yes    Weight Concern No    Special Diet Yes    Exercise No    Seat Belt Yes     Social Determinants of Health      [facility], [facility]    Housing Stability        REVIEW OF SYSTEMS:   GENERAL HEALTH: feels well  otherwise       EXAM:   /88   Pulse 78   Temp 98 °F (36.7 °C) (Temporal)   Ht 4' 11\" (1.499 m)   Wt 133 lb 12.8 oz (60.7 kg)   SpO2 98%   BMI 27.02 kg/m²   GENERAL: well developed, well nourished,in no apparent distress  SKIN: no rashes   The L 2nd toe is red an  slightly swollen    NECK: supple,no adenopathy  LUNGS: clear to auscultation  CARDIO: RRR without murmur  ABD:    small upper abd herna noted that is uncomfortable to deep pressure      EXTREMITIES: no edema    ASSESSMENT AND PLAN:   1. Cellulitis of second toe of left foot  Will treat w keflex     call w update        2. Hernia of abdominal wall  Check US     may need Surgery eval      - US ABDOMEN LIMITED (CPT=76705); Future    3. Essential hypertension  Stable BP        4. Abdominal aortic aneurysm (AAA) without rupture, unspecified part (HCC)  This hernia ia not pulsatile     discussed her US        5. Positive TORI (antinuclear antibody)  Discussed labs   to see rheum        6. Brain fog  OK to hold the crestor to see if helps     call w update 1-2 mo        7. Pure hypercholesterolemia  As above    to hold crestor        The patient indicates understanding of these issues and agrees to the plan.  .

## 2024-08-06 DIAGNOSIS — I10 ESSENTIAL HYPERTENSION: ICD-10-CM

## 2024-08-07 RX ORDER — VALSARTAN 160 MG/1
160 TABLET ORAL DAILY
Qty: 90 TABLET | Refills: 0 | Status: SHIPPED | OUTPATIENT
Start: 2024-08-07

## 2024-08-07 RX ORDER — AMLODIPINE BESYLATE 2.5 MG/1
2.5 TABLET ORAL DAILY
Qty: 90 TABLET | Refills: 0 | Status: SHIPPED | OUTPATIENT
Start: 2024-08-07

## 2024-08-07 NOTE — TELEPHONE ENCOUNTER
Requesting    valsartan 160 MG Oral Tab         Sig: Take 1 tablet (160 mg total) by mouth daily.    Disp: 90 tablet    Refills: 0    Start: 8/6/2024    Class: Normal    Non-formulary    Last ordered: 3 months ago (4/29/2024) by Irina Calix MD    Hypertension Medications Protocol Gdiapb3208/06/2024 12:52 PM   Protocol Details CMP or BMP in past 12 months    Last BP reading less than 140/90    In person appointment or virtual visit in the past 12 mos or appointment in next 3 mos    EGFRCR or GFRNAA > 50       amLODIPine 2.5 MG Oral Tab         Sig: Take 1 tablet (2.5 mg total) by mouth daily.    Disp: 90 tablet    Refills: 0    Start: 8/6/2024    Class: Normal    Non-formulary For: Essential hypertension    Last ordered: 3 months ago (4/29/2024) by Irina Calix MD    Hypertension Medications Protocol Vcdiiz6108/06/2024 12:52 PM   Protocol Details CMP or BMP in past 12 months    Last BP reading less than 140/90    In person appointment or virtual visit in the past 12 mos or appointment in next 3 mos    EGFRCR or GFRNAA > 50        LOV: 7/17/2024  RTC: 1-2 months   Last Relevant Labs: 5/30/2024  Filled: 4/29/2024 #90 with 0 refills    Future Appointments   Date Time Provider Department Center   8/30/2024 10:30 AM [facility]   10/31/2024 10:20 AM Brendan Pack MD [id number] [address]

## 2024-08-28 NOTE — PROGRESS NOTES
HPI:   Zahne Frederick is a 69 year old female who presents for a complete physical exam. Symptoms: denies discharge, itching, burning or dysuria, is menopausal.   Regular SBE- yes,Sexually active- no,  Contraception- menopausal. STD history- none    Pt c/o left sided chest pain since May. Pt saw Dr Calix for it. He thought it could be a hernia. Pt worried about left sided CP especially with laying down.Pt Usually lays on the L side. 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 Pt does not feel it is not exertional. Pt feels it along the sternum but more to the left and it seems to go up into her upper chest area. Pt going for ultrasound tomorrow.  Pt does has some epigastric pain, and occ reflux. Pt not sure if related.  Pt would also like to make sure it is not heart related.    Screening:  Immunization History   Administered Date(s) Administered    >=3 YRS FLUZONE OR FLUARIX QUAD PRESERVE FREE SINGLE DOSE (88459) FLU CLINIC 11/27/2015    Covid-19 Vaccine Moderna 100 mcg/0.5 ml 04/03/2021, 05/01/2021    Covid-19 Vaccine Moderna 50 Mcg/0.25 Ml 11/30/2021    Covid-19 Vaccine Moderna Bivalent 50mcg/0.5mL 12+ years 10/02/2022, 10/07/2023    DTP 08/10/2001, 10/15/2001    FLU VAC High Dose 65 YRS & Older PRSV Free (99740) 10/02/2022    FLULAVAL 6 months & older 0.5 ml Prefilled syringe (34719) 10/23/2019, 10/10/2020    FLUZONE 6 months and older PFS 0.5 ml (54095) 10/18/2021    HEP B/HIB 08/10/2001, 10/15/2001    IPV 08/10/2001, 10/15/2001    Influenza 11/25/2008, 10/23/2017, 09/19/2018, 10/19/2021, 10/02/2022, 10/07/2023    Moderna Covid-19 Vaccine 50mcg/0.5ml 12yrs+ (0545-0217) 10/07/2023    Pneumococcal (Prevnar 13) 06/14/2019    Pneumococcal (Prevnar 7) 08/10/2001, 10/15/2001    Pneumovax 23 10/10/2020    RSV, recombinant, RSVpreF, adjuvanted (Arexvy) 11/18/2023    TDAP 10/07/2015    Zoster Vaccine Recombinant Adjuvanted  (Shingrix) 06/30/2022, 12/19/2022      Menarche- 15 yr  PAP- 6/10/20-aged out  Any abnormal pap smears- none  Pregnancies- 6, Live births- 1  Mammogram-  2/24/24   Any breast cancer- none, or any gynecological cancer- none, any cancers none  Labs- 5/30/24  DEXA over 65yr- 10/1/22 shows osteopenia  Flu shot-  10/7/23  Colon- 12/14/23    Immunization History   Administered Date(s) Administered    >=3 YRS FLUZONE OR FLUARIX QUAD PRESERVE FREE SINGLE DOSE (13344) FLU CLINIC 11/27/2015    Covid-19 Vaccine Moderna 100 mcg/0.5 ml 04/03/2021, 05/01/2021    Covid-19 Vaccine Moderna 50 Mcg/0.25 Ml 11/30/2021    Covid-19 Vaccine Moderna Bivalent 50mcg/0.5mL 12+ years 10/02/2022, 10/07/2023    DTP 08/10/2001, 10/15/2001    FLU VAC High Dose 65 YRS & Older PRSV Free (28006) 10/02/2022    FLULAVAL 6 months & older 0.5 ml Prefilled syringe (84768) 10/23/2019, 10/10/2020    FLUZONE 6 months and older PFS 0.5 ml (61619) 10/18/2021    HEP B/HIB 08/10/2001, 10/15/2001    IPV 08/10/2001, 10/15/2001    Influenza 11/25/2008, 10/23/2017, 09/19/2018, 10/19/2021, 10/02/2022, 10/07/2023    Moderna Covid-19 Vaccine 50mcg/0.5ml 12yrs+ (5830-4995) 10/07/2023    Pneumococcal (Prevnar 13) 06/14/2019    Pneumococcal (Prevnar 7) 08/10/2001, 10/15/2001    Pneumovax 23 10/10/2020    RSV, recombinant, RSVpreF, adjuvanted (Arexvy) 11/18/2023    TDAP 10/07/2015    Zoster Vaccine Recombinant Adjuvanted (Shingrix) 06/30/2022, 12/19/2022     Wt Readings from Last 6 Encounters:   08/29/24 134 lb 6.4 oz (61 kg)   07/17/24 133 lb 12.8 oz (60.7 kg)   05/04/24 131 lb 9.6 oz (59.7 kg)   07/15/23 126 lb 9.6 oz (57.4 kg)   05/24/23 126 lb 6 oz (57.3 kg)   04/07/23 131 lb 12.8 oz (59.8 kg)     Body mass index is 27.38 kg/m².     Lab Results   Component Value Date    GLU 88 05/30/2024    GLU 95 04/08/2023     (H) 04/01/2022     Lab Results   Component Value Date    CHOLEST 221 (H) 05/30/2024    CHOLEST 375 (H) 04/08/2023    CHOLEST 369 (H) 08/13/2022     Lab  Results   Component Value Date    HDL 49 2024    HDL 65 (H) 2023    HDL 48 2022     Lab Results   Component Value Date     (H) 2024     (H) 2023     (H) 2022     Lab Results   Component Value Date    AST 21 2024    AST 19 2023    AST 17 2022     Lab Results   Component Value Date    ALT 23 2024    ALT 28 2023    ALT 33 2022       Current Outpatient Medications   Medication Sig Dispense Refill    cholecalciferol (VITAMIN D3) 125 MCG (5000 UT) Oral Cap Take 1 capsule (5,000 Units total) by mouth daily.      Omega 3 1000 MG Oral Cap Take 2 capsules by mouth daily.      Zinc 50 MG Oral Tab Take 1 tablet by mouth daily.      MAGNESIUM GLYCINATE OR Take 1 tablet by mouth daily.      valsartan 160 MG Oral Tab Take 1 tablet (160 mg total) by mouth daily. 90 tablet 0    amLODIPine 2.5 MG Oral Tab Take 1 tablet (2.5 mg total) by mouth daily. 90 tablet 0    rosuvastatin 10 MG Oral Tab Take 1 tablet (10 mg total) by mouth nightly. (Patient not taking: Reported on 2024) 90 tablet 0      Past Medical History:    Allergic rhinitis    Arthritis    Benign neoplasm of colon    Essential hypertension    Controlled by medications    Hyperlipidemia    LDL elevated    Osteoarthritis    Had bilateral TKR  &     Pure hypercholesterolemia      Past Surgical History:   Procedure Laterality Date    Breast surgery procedure unlisted      left breast biopsy during breast surgery      2001    Colonoscopy  10/26/2018    Colonoscopy & polypectomy  5/09   10/24/14    yvette 5  Jorge    D & c  , ,,    Dilation & curettage cervical stump      x5 pt has had 5 miscarraiges and  a D&C after each one    Excision, infec graft, neck  late     nodule removed from R side of neck 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    Knee replacement surgery Right     Anay    Laparoscopy  procedure unlisted  01/01/1999    (diagnostic) for endometriosis    Sarahi biopsy stereo nodule 1 site left (cpt=19081)  12/2006    benign    Sarahi biopsy stereo nodule 2 site bilat (cpt=19081/65840)  2006    negra    Sarahi localization wire 1 site right (cpt=19281)  1976    negra    Other surgical history  01/01/2002    excision of neuroma of foot    Total knee replacement Left     2018      Family History   Problem Relation Age of Onset    Hypertension Mother     Obesity Mother     Stroke Mother     Hypertension Brother     Obesity Brother       Social History:   Social History     Socioeconomic History    Marital status:    Tobacco Use    Smoking status: Never    Smokeless tobacco: Never   Vaping Use    Vaping status: Never Used   Substance and Sexual Activity    Alcohol use: No     Alcohol/week: 0.0 standard drinks of alcohol    Drug use: No    Sexual activity: Never   Other Topics Concern    Caffeine Concern Yes     Comment: 1 cup of coffee daily.    Stress Concern Yes    Weight Concern No    Special Diet Yes    Exercise No    Seat Belt Yes     Social Determinants of Health      Received from [facility], [facility]    Housing Stability     Occ: customer service. : no. Children: 1.   Exercise: minimal.  Diet: watches fats closely and watches sugar closely     REVIEW OF SYSTEMS:   GENERAL: feels well otherwise  SKIN: denies any unusual skin lesions  EYES:denies blurred vision or double vision  HEENT: denies nasal congestion, sinus pain or ST  LUNGS: denies shortness of breath with exertion  CARDIOVASCULAR: denies chest pain on exertion,+HTN. See HPI  GI: denies abdominal pain,+GERD. See HPI  : denies dysuria, vaginal discharge or itching  MUSCULOSKELETAL: denies back pain  NEURO: denies headaches  PSYCHE: denies depression or anxiety  HEMATOLOGIC: denies hx of anemia  ENDOCRINE: denies thyroid history,+DM  ALL/ASTHMA: +allergies    EXAM:   /74 (BP Location:  Right arm, Patient Position: Sitting, Cuff Size: adult)   Pulse 73   Temp 97.5 °F (36.4 °C) (Temporal)   Resp 12   Ht 4' 10.75\" (1.492 m)   Wt 134 lb 6.4 oz (61 kg)   SpO2 99%   Breastfeeding No   BMI 27.38 kg/m²   Body mass index is 27.38 kg/m².   GENERAL: well developed, well nourished,in no apparent distress  SKIN: no rashes,no suspicious lesions  HEENT: atraumatic, normocephalic,ears and throat are clear  EYES:PERRLA, EOMI, normal optic disk,conjunctiva are clear  NECK: supple,no adenopathy,no bruits  CHEST: + sternum tenderness more on the left side, left chest soreness  BREAST: no dominant or suspicious mass  LUNGS: clear to auscultation  CARDIO: RRR without murmur  -EKG shows Normal sinus rhythm with rate of 63, NE and QRS intervals within normal limits, QRS complexes lead III and no T-wave abnormalities. no acute ischemia noted. Inferior infarct,age undetermined  - Minor changes from previous tracings per Dr. Calix.  GI: good BS's,no masses, HSM. + epigastric tenderness  :deferred  MUSCULOSKELETAL: back is not tender,FROM of the back  EXTREMITIES: no cyanosis, clubbing or edema  NEURO: Oriented times three,cranial nerves are intact,motor and sensory are grossly intact    ASSESSMENT AND PLAN:   Zhane Frederick is a 69 year old female who presents for a complete physical exam. Mammogram up to date. Health maintenance, will check fasting Labs in November when due. . Pt' s weight is Body mass index is 27.38 kg/m²., recommended low fat diet and aerobic exercise 30 minutes three times weekly.  The patient indicates understanding of these issues and agrees to the plan.  The patient is asked to return for CPX in one year.  1. Abdominal pain, unspecified abdominal location  - avoid trigger foods. Pepcid/prilosec otc as needed  - US ABDOMEN COMPLETE (CPT=76700); Future    2. Left chest pressure  - if worsens to go to the ER.  - EKG with interpretation and Report -IN OFFICE [[id number]]  - CARD ECHO 2D DOPPLER  (CPT=93306); Future    3. Routine general medical examination at a health care facility    - Hemoglobin A1C; Future  - Comp Metabolic Panel (14); Future      Encounter Diagnoses   Name Primary?    Laboratory examination ordered as part of a routine general medical examination     Abdominal pain, unspecified abdominal location     Left chest pressure     Routine general medical examination at a health care facility Yes       Orders Placed This Encounter   Procedures    Hemoglobin A1C    Comp Metabolic Panel (14)       Meds & Refills for this Visit:  Requested Prescriptions      No prescriptions requested or ordered in this encounter       Imaging & Consults:  ELECTROCARDIOGRAM, COMPLETE  US ABDOMEN COMPLETE (CPT=76700)  CARD ECHO 2D DOPPLER (CPT=93306)

## 2024-08-29 ENCOUNTER — OFFICE VISIT (OUTPATIENT)
Dept: INTERNAL MEDICINE CLINIC | Facility: CLINIC | Age: 69
End: 2024-08-29
Payer: COMMERCIAL

## 2024-08-29 VITALS
SYSTOLIC BLOOD PRESSURE: 134 MMHG | RESPIRATION RATE: 12 BRPM | BODY MASS INDEX: 27.45 KG/M2 | HEIGHT: 58.75 IN | OXYGEN SATURATION: 99 % | WEIGHT: 134.38 LBS | HEART RATE: 73 BPM | TEMPERATURE: 98 F | DIASTOLIC BLOOD PRESSURE: 74 MMHG

## 2024-08-29 DIAGNOSIS — R10.9 ABDOMINAL PAIN, UNSPECIFIED ABDOMINAL LOCATION: ICD-10-CM

## 2024-08-29 DIAGNOSIS — R07.89 LEFT CHEST PRESSURE: ICD-10-CM

## 2024-08-29 DIAGNOSIS — Z00.00 ROUTINE GENERAL MEDICAL EXAMINATION AT A HEALTH CARE FACILITY: Primary | ICD-10-CM

## 2024-08-29 LAB
ATRIAL RATE: 61 BPM
P AXIS: 26 DEGREES
P-R INTERVAL: 176 MS
Q-T INTERVAL: 436 MS
QRS DURATION: 94 MS
QTC CALCULATION (BEZET): 438 MS
R AXIS: 3 DEGREES
T AXIS: 34 DEGREES
VENTRICULAR RATE: 61 BPM

## 2024-08-29 PROCEDURE — 99397 PER PM REEVAL EST PAT 65+ YR: CPT | Performed by: FAMILY MEDICINE

## 2024-08-29 PROCEDURE — 3008F BODY MASS INDEX DOCD: CPT | Performed by: FAMILY MEDICINE

## 2024-08-29 PROCEDURE — 3075F SYST BP GE 130 - 139MM HG: CPT | Performed by: FAMILY MEDICINE

## 2024-08-29 PROCEDURE — 93000 ELECTROCARDIOGRAM COMPLETE: CPT | Performed by: FAMILY MEDICINE

## 2024-08-29 PROCEDURE — 99213 OFFICE O/P EST LOW 20 MIN: CPT | Performed by: FAMILY MEDICINE

## 2024-08-29 PROCEDURE — 3078F DIAST BP <80 MM HG: CPT | Performed by: FAMILY MEDICINE

## 2024-08-29 RX ORDER — GINSENG 100 MG
1 CAPSULE ORAL DAILY
COMMUNITY

## 2024-08-29 RX ORDER — OMEGA-3 FATTY ACIDS/FISH OIL 300-1000MG
2 CAPSULE ORAL DAILY
COMMUNITY

## 2024-08-30 ENCOUNTER — HOSPITAL ENCOUNTER (OUTPATIENT)
Dept: ULTRASOUND IMAGING | Age: 69
Discharge: HOME | End: 2024-08-30
Attending: FAMILY MEDICINE
Payer: COMMERCIAL

## 2024-08-30 DIAGNOSIS — K43.9 HERNIA OF ABDOMINAL WALL: ICD-10-CM

## 2024-08-30 PROCEDURE — 76705 ECHO EXAM OF ABDOMEN: CPT | Performed by: FAMILY MEDICINE

## 2024-09-23 ENCOUNTER — HOSPITAL ENCOUNTER (OUTPATIENT)
Dept: CV DIAGNOSTICS | Age: 69
Discharge: HOME | End: 2024-09-23
Attending: FAMILY MEDICINE
Payer: COMMERCIAL

## 2024-09-23 DIAGNOSIS — R07.89 LEFT CHEST PRESSURE: ICD-10-CM

## 2024-09-23 PROCEDURE — 93306 TTE W/DOPPLER COMPLETE: CPT | Performed by: FAMILY MEDICINE

## 2024-10-15 ENCOUNTER — PATIENT MESSAGE (OUTPATIENT)
Dept: INTERNAL MEDICINE CLINIC | Facility: CLINIC | Age: 69
End: 2024-10-15

## 2024-10-24 RX ORDER — ROSUVASTATIN CALCIUM 10 MG/1
10 TABLET, COATED ORAL NIGHTLY
Qty: 90 TABLET | Refills: 0 | Status: SHIPPED | OUTPATIENT
Start: 2024-10-24

## 2024-10-24 NOTE — TELEPHONE ENCOUNTER
Requesting    rosuvastatin 10 MG Oral Tab         Sig: Take 1 tablet (10 mg total) by mouth nightly.    Disp: 90 tablet    Refills: 0    Start: 10/23/2024    Class: Normal    Non-formulary    Last ordered: 5 months ago (4/29/2024) by Irina Calix MD    Cholesterol Medication Protocol Bhvhks30/23/2024 07:46 PM   Protocol Details ALT < 80    ALT resulted within past year    Lipid panel within past 12 months    In person appointment or virtual visit in the past 12 mos or appointment in next 3 mos        LOV: 8/29/2024  RTC: 6 months   Last Relevant Labs: 5/30/2024  Filled: 4/29/2024 #90 with 0 refills    Future Appointments   Date Time Provider Department Center   10/31/2024 10:20 AM Brendan Pack MD [id number] [address]

## 2024-10-31 ENCOUNTER — OFFICE VISIT (OUTPATIENT)
Dept: RHEUMATOLOGY | Facility: CLINIC | Age: 69
End: 2024-10-31
Payer: COMMERCIAL

## 2024-10-31 VITALS
WEIGHT: 136 LBS | DIASTOLIC BLOOD PRESSURE: 88 MMHG | HEIGHT: 59.5 IN | RESPIRATION RATE: 16 BRPM | BODY MASS INDEX: 27.06 KG/M2 | HEART RATE: 80 BPM | OXYGEN SATURATION: 96 % | TEMPERATURE: 98 F | SYSTOLIC BLOOD PRESSURE: 134 MMHG

## 2024-10-31 DIAGNOSIS — M94.0 COSTOCHONDRITIS: ICD-10-CM

## 2024-10-31 DIAGNOSIS — R70.0 ELEVATED SED RATE: ICD-10-CM

## 2024-10-31 DIAGNOSIS — M25.542 ARTHRALGIA OF BOTH HANDS: ICD-10-CM

## 2024-10-31 DIAGNOSIS — R76.8 POSITIVE ANA (ANTINUCLEAR ANTIBODY): Primary | ICD-10-CM

## 2024-10-31 DIAGNOSIS — M79.672 PAIN IN BOTH FEET: ICD-10-CM

## 2024-10-31 DIAGNOSIS — M79.671 PAIN IN BOTH FEET: ICD-10-CM

## 2024-10-31 DIAGNOSIS — M25.50 ARTHRALGIA, UNSPECIFIED JOINT: ICD-10-CM

## 2024-10-31 DIAGNOSIS — M25.541 ARTHRALGIA OF BOTH HANDS: ICD-10-CM

## 2024-10-31 PROCEDURE — 3075F SYST BP GE 130 - 139MM HG: CPT | Performed by: INTERNAL MEDICINE

## 2024-10-31 PROCEDURE — 3079F DIAST BP 80-89 MM HG: CPT | Performed by: INTERNAL MEDICINE

## 2024-10-31 PROCEDURE — 3008F BODY MASS INDEX DOCD: CPT | Performed by: INTERNAL MEDICINE

## 2024-10-31 PROCEDURE — 99244 OFF/OP CNSLTJ NEW/EST MOD 40: CPT | Performed by: INTERNAL MEDICINE

## 2024-10-31 RX ORDER — AMOXICILLIN 500 MG/1
2000 CAPSULE ORAL AS NEEDED
COMMUNITY
Start: 2024-10-07

## 2024-10-31 NOTE — PROGRESS NOTES
Rheumatology New Patient Note  =====================================================================================================    Date of visit: 10/31/2024  ?  Chief complaint: +TORI  Chief Complaint   Patient presents with    [facility]     New pt. Dr. Calix referral. Abnormal TORI. Having joint pain. Stiffness in the morning that lasts a few minutes. Back pain worsened by working from home at a computer. No rashes. Some chest pain being worked up with Cardio. Converted rapid score of 3.7     Referring (will send letter)  PCP  Irina Calix MD  Fax: [phone]  Phone: [phone]  =====================================================================================================  HPI    Zhane Frederick is a 69 year old female     +TORI evaluation.  -Cardiac symptoms, PCP ruled out cardiac issues. TTE wnl.   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  -hx of bunion surgery on the right.   -Dry eye is noted.     Denies current alopecia, malar rash, photosensitivity rash, discoid lesions, oral/nasal ulcers, pleuritic chest pain, arthritis, seizures/psychosis, Raynaud's, dry mouth, or blood clots.    1 children:  -miscarriages: x 6. Unclear cause    Denies miscarriages or obstetric events (early pre-eclampsia, IUGR, placental insufficiency)  -Prior pregnancies and/or children:  --Pregnancy complications    14 point ROS negative except noted above    Medications:  Medications Ordered Prior to Encounter[1]    Past Medical History:  Past Medical History:    Allergic rhinitis    Arthritis    Benign neoplasm of colon    Essential hypertension    Controlled by medications    Hyperlipidemia    LDL elevated    Osteoarthritis    Had bilateral TKR 2012 & 2018    Pure hypercholesterolemia     Past Surgical History:  Past Surgical History:   Procedure Laterality Date    Breast surgery procedure unlisted      left breast biopsy during breast surgery       06/01/2001    Colonoscopy  10/26/2018    Colonoscopy & polypectomy  5/09   10/24/14    yvette 5  Jorge    D & c  1995, 1997,1998,1999    Dilation & curettage cervical stump      x5 pt has had 5 miscarraiges and  a D&C after each one    Excision, infec graft, neck  late 1980    nodule removed from R side of neck 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    Knee replacement surgery Right 2011    [facility]    Laparoscopy procedure unlisted  01/01/1999    (diagnostic) for endometriosis    Sarahi biopsy stereo nodule 1 site left (cpt=19081)  12/2006    benign    Sarahi biopsy stereo nodule 2 site bilat (cpt=19081/50297)  2006    [place]    Sarahi localization wire 1 site right (cpt=19281)  1976    [facility]    Other surgical history  01/01/2002    excision of neuroma of foot    Total knee replacement Left     2018     Family History:  Family History   Problem Relation Age of Onset    Hypertension Mother     Obesity Mother     Stroke Mother     Hypertension Brother     Obesity Brother      Social History:  Social History     Socioeconomic History    Marital status:    Tobacco Use    Smoking status: Never    Smokeless tobacco: Never   Vaping Use    Vaping status: Never Used   Substance and Sexual Activity    Alcohol use: Never    Drug use: Never    Sexual activity: Never   Other Topics Concern    Caffeine Concern Yes     Comment: 1 cup of coffee daily.    Stress Concern Yes    Weight Concern No    Special Diet Yes    Exercise No    Seat Belt Yes     Social Drivers of Health      [facility]    Housing Stability     ?  Allergies:  Allergies[2]      Objective    Vitals:    10/31/24 1026   BP: 134/88   Pulse: 80   Resp: 16   Temp: 98 °F (36.7 °C)   SpO2: 96%   Weight: 136 lb (61.7 kg)   Height: 4' 11.5\" (1.511 m)     GEN: NAD, well-nourished.   HEENT: Head: NCAT. Face: No lesions. Eyes: Conjunctiva clear. Sclera are anicteric. PERRLA. EOMs are  full. Ears: The right and left ear canals are clear.  Nose: No external or internal nasal deformities. Nasal septum is midline. Mouth: The lips are within normal limits.  No oral ulcers Tongue is midline with no lesions. The oral cavity is clear.   Neck: Supple. No neck masses. No thyromegaly. No LAD, parotid or submandicular gland palpated.   CV: RRR, no mrg, S1/S2  PULM: CTAB, no wrr, easy effort  Extremities: No cyanosis, edema or deformities.   Neurologic: Strength, CN2-12 grossly intact   Psych: normal affect.   Skin: No lesions or rashes.  MSK: 28 joint count performed. No evidence of synovitis in mcp, pip, dip, wrist, elbows, shoulders, hips, knees, ankles, mtp unless otherwise noted. Full ROM of elbows, wrists, knees.     MCP thickening and PIP/DIP thickening.?  S/p knee TKA post-surgical changes  -Reproducible chest pain in the lower costosternal joints.    Labs:    5/2024  TORI 1: 320 homogenous  1: 80 nucleolar  CRP negative  RF negative  Sed rate 63  Vitamin D, TSH WNL      Lab Results   Component Value Date    WBC 6.6 05/30/2024    RBC 4.71 05/30/2024    HGB 14.2 05/30/2024    HCT 41.7 05/30/2024    .0 05/30/2024    MPV 10.6 03/02/2013    MCV 88.5 05/30/2024    MCH 30.1 05/30/2024    MCHC 34.1 05/30/2024    RDW 13.1 05/30/2024    NEPRELIM 3.04 05/30/2024    NEPERCENT 46.2 05/30/2024    LYPERCENT 40.0 05/30/2024    MOPERCENT 9.1 05/30/2024    EOPERCENT 3.6 05/30/2024    BAPERCENT 0.8 05/30/2024    NE 3.04 05/30/2024    LYMABS 2.63 05/30/2024    MOABSO 0.60 05/30/2024    EOABSO 0.24 05/30/2024    BAABSO 0.05 05/30/2024     Lab Results   Component Value Date    GLU 88 05/30/2024    BUN 24 (H) 05/30/2024    BUNCREA 27.9 (H) 05/30/2024    CREATSERUM 0.86 05/30/2024    ANIONGAP 7 05/30/2024    GFR 66 11/12/2016    GFRNAA 66 04/01/2022    GFRAA 76 04/01/2022    CA 10.2 05/30/2024    OSMOCALC 299 (H) 05/30/2024    ALKPHO 85 05/30/2024    AST 21 05/30/2024    ALT 23 05/30/2024    BILT 0.7 05/30/2024    TP  7.7 05/30/2024    ALB 4.4 05/30/2024    GLOBULIN 3.3 05/30/2024     05/30/2024    K 3.9 05/30/2024     05/30/2024    CO2 27.0 05/30/2024         Lab Results   Component Value Date    ANASCRN Positive (A) 05/30/2024     No results found for: \"SSA\", \"SSAUR\", \"ANTISSA\", \"SSA52\", \"SSA60\", \"SSADD\", \"SSB\", \"ANTISSB\"  No results found for: \"DSDNA\", \"ANTIDSDNA\", \"SMUD\", \"ANTISM\", \"SM\", \"RNP\", \"ANTIRNP\", \"SMITHRNP\"  No results found for: \"SCL70\", \"SCL\", \"QWBLGTB87\"  No results found for: \"C3\", \"C4\"  No results found for: \"DRVVT\", \"LAINT\", \"PTTLUPUS\", \"LUPUSINTERP\", \"LA\", \"Q7WI5YRTMN\", \"O7LV2LLJWQ\", \"I6VKVSWJMQ\", \"O3IHKZQHYF\"  No results found for: \"CARDIOLIPIGG\", \"CARDIOLIPIGM\", \"CARDIOLIPIGA\", \"CARDIOIGA\", \"CARLIP\"    Additional Labs:    Radiology:    Radiology review:      =====================================================================================================  Assessment and Plan  Assessment:  1. Positive TORI (antinuclear antibody)    2. Arthralgia, unspecified joint    3. Arthralgia of both hands    4. Elevated sed rate    5. Pain in both feet    6. Costochondritis      #Positive TORI:  -dry eye but no specific features of a systemic autoimmune disease  -suspect this may be a false positive  -6x miscarriages in the past  -Nucleolar pattern can be seen in scleroderma but the patient has no history of Raynaud's, sclerodactyly, skin thickening to suggest  systemic sclerosis.   ?  #costochondritis:  -TTE wnl  -no anginal symptoms  -Reproducible chest pain with palpation points to an MSK etiology.    # Elevated sed rate, low CRP    #Bilateral hand arthralgia  -Pannus is confounding, however thickening overlying the MCPs and PIPs raises concern for some sort of crystalline arthritis deposition process    #Bilateral foot pain  -S/p right bunionectomy  -Right midfoot OA changes noted on prior x-rays.  I cannot review these x-rays since they were done in the podiatry office but I can see their  interpretation    Plan:  -Further TORI workup including TORI by extractable nuclear antigens, RNA polymerase 3 antibody given nucleolar pattern TORI, CCP, antiphospholipid antibodies, complements, inflammatory marker repeat  -TPO/TG antibodies  -Arthralgia test including B12/folate, iron studies  -Uric acid  -X-ray of the bilateral hands.  If negative consider dual-energy CT.  Patient wants to hold off on any advanced imaging given her concerned about her financial status and significant co-pays  -X-ray of the bilateral ribs to evaluate for any occult fracture given chronic costosternal joint pain.  Discussed she should follow-up with her PCP regarding this.  Unlikely this is due to any immune-mediated inflammatory arthritis.  Suspect this is due to axial/core issues due to her sedentary work and minimal exercise.  Patient declines PT referral    Diagnoses and all orders for this visit:    Positive TORI (antinuclear antibody)  -     Lupus Anticoagulant Comp; Future  -     Beta 2 Glycoprotein I AB, G/M; Future  -     Anticardiolipin Ab, IGG, Qn; Future  -     Anticardiolipin AB, IGM, Qn; Future  -     CBC With Differential With Platelet; Future  -     Comp Metabolic Panel (14); Future  -     Complement C3, Serum; Future  -     Complement C4, Serum; Future  -     C-Reactive Protein; Future  -     Sed Rate, Westergren (Automated); Future  -     Urinalysis with Culture Reflex; Future  -     Cyclic Citrullinate Pep. IGG; Future  -     Protein,Total,Urine, Random; Future  -     Creatinine, Urine, Random; Future  -     B12 AND FOLATE; Future  -     Iron And Tibc; Future  -     Ferritin; Future  -     RNA Polymerase III Antibody, IgG; Future  -     Connective Tissue Disease (TORI) Screen, Reflex Specific Antibody; Future  -     Thyroid peroxidase & thyroglobulin ab; Future    Arthralgia, unspecified joint  -     Lupus Anticoagulant Comp; Future  -     Beta 2 Glycoprotein I AB, G/M; Future  -     Anticardiolipin Ab, IGG, Qn;  Future  -     Anticardiolipin AB, IGM, Qn; Future  -     CBC With Differential With Platelet; Future  -     Comp Metabolic Panel (14); Future  -     Complement C3, Serum; Future  -     Complement C4, Serum; Future  -     C-Reactive Protein; Future  -     Sed Rate, Westergren (Automated); Future  -     Urinalysis with Culture Reflex; Future  -     Cyclic Citrullinate Pep. IGG; Future  -     Protein,Total,Urine, Random; Future  -     Creatinine, Urine, Random; Future  -     B12 AND FOLATE; Future  -     Iron And Tibc; Future  -     Ferritin; Future  -     RNA Polymerase III Antibody, IgG; Future  -     Connective Tissue Disease (TORI) Screen, Reflex Specific Antibody; Future  -     Thyroid peroxidase & thyroglobulin ab; Future    Arthralgia of both hands  -     Lupus Anticoagulant Comp; Future  -     Beta 2 Glycoprotein I AB, G/M; Future  -     Anticardiolipin Ab, IGG, Qn; Future  -     Anticardiolipin AB, IGM, Qn; Future  -     CBC With Differential With Platelet; Future  -     Comp Metabolic Panel (14); Future  -     Complement C3, Serum; Future  -     Complement C4, Serum; Future  -     C-Reactive Protein; Future  -     Sed Rate, Westergren (Automated); Future  -     Urinalysis with Culture Reflex; Future  -     Cyclic Citrullinate Pep. IGG; Future  -     Protein,Total,Urine, Random; Future  -     Creatinine, Urine, Random; Future  -     B12 AND FOLATE; Future  -     Iron And Tibc; Future  -     Ferritin; Future  -     RNA Polymerase III Antibody, IgG; Future  -     XR HAND BILAT (MIN 3 VIEWS) (CPT=73130-50); Future  -     Uric Acid; Future    Elevated sed rate    Pain in both feet    Costochondritis  -     XR RIBS WITH CHEST, BILATERAL   (CPT=71111); Future        No follow-ups on file.      The above plan of care, diagnosis, orders, and follow-up were discussed with the patient. Questions related to this recommended plan of care were answered.    Thank you for referring this delightful patient to me. Please feel  free to contact me with any questions.     This report was performed utilizing speech recognition software technology. Despite proofreading, speech recognition errors could escape detection. If a word or phrase is confusing or out of context, please do not hesitate to call for   clarification.       Kind regards      Brendan Pack MD  [facility]         [1]   Current Outpatient Medications on File Prior to Visit   Medication Sig Dispense Refill    amoxicillin 500 MG Oral Cap Take 4 capsules (2,000 mg total) by mouth as needed. 1 HR BEFORE DENTAL APPT      rosuvastatin 10 MG Oral Tab Take 1 tablet (10 mg total) by mouth nightly. 90 tablet 0    cholecalciferol (VITAMIN D3) 125 MCG (5000 UT) Oral Cap Take 1 capsule (5,000 Units total) by mouth daily.      Omega 3 1000 MG Oral Cap Take 2 capsules by mouth daily.      Zinc 50 MG Oral Tab Take 1 tablet by mouth daily.      MAGNESIUM GLYCINATE OR Take 1 tablet by mouth daily.      valsartan 160 MG Oral Tab Take 1 tablet (160 mg total) by mouth daily. 90 tablet 0    amLODIPine 2.5 MG Oral Tab Take 1 tablet (2.5 mg total) by mouth daily. 90 tablet 0     No current facility-administered medications on file prior to visit.   [2]   Allergies  Allergen Reactions    Niaspan [Niacin]      Face flushed    Simvastatin      Muscle aches  pravach ok      Seasonal ITCHING

## 2024-11-18 DIAGNOSIS — I10 ESSENTIAL HYPERTENSION: ICD-10-CM

## 2024-11-19 RX ORDER — VALSARTAN 160 MG/1
160 TABLET ORAL DAILY
Qty: 90 TABLET | Refills: 0 | Status: SHIPPED | OUTPATIENT
Start: 2024-11-19

## 2024-11-19 RX ORDER — AMLODIPINE BESYLATE 2.5 MG/1
2.5 TABLET ORAL DAILY
Qty: 90 TABLET | Refills: 0 | Status: SHIPPED | OUTPATIENT
Start: 2024-11-19

## 2024-11-21 ENCOUNTER — HOSPITAL ENCOUNTER (OUTPATIENT)
Dept: GENERAL RADIOLOGY | Age: 69
Discharge: HOME | End: 2024-11-21
Attending: INTERNAL MEDICINE
Payer: COMMERCIAL

## 2024-11-21 DIAGNOSIS — M25.541 ARTHRALGIA OF BOTH HANDS: ICD-10-CM

## 2024-11-21 DIAGNOSIS — M25.542 ARTHRALGIA OF BOTH HANDS: ICD-10-CM

## 2024-11-21 PROCEDURE — 73130 X-RAY EXAM OF HAND: CPT | Performed by: INTERNAL MEDICINE

## 2024-11-29 ENCOUNTER — LAB ENCOUNTER (OUTPATIENT)
Dept: LAB | Age: 69
End: 2024-11-29
Attending: INTERNAL MEDICINE
Payer: COMMERCIAL

## 2024-11-29 DIAGNOSIS — R76.8 POSITIVE ANA (ANTINUCLEAR ANTIBODY): ICD-10-CM

## 2024-11-29 DIAGNOSIS — M25.50 ARTHRALGIA, UNSPECIFIED JOINT: ICD-10-CM

## 2024-11-29 DIAGNOSIS — M25.542 ARTHRALGIA OF BOTH HANDS: ICD-10-CM

## 2024-11-29 DIAGNOSIS — M25.541 ARTHRALGIA OF BOTH HANDS: ICD-10-CM

## 2024-11-29 LAB
ALBUMIN SERPL-MCNC: 4.5 G/DL (ref 3.2–4.8)
ALBUMIN/GLOB SERPL: 1.3 {RATIO} (ref 1–2)
ALP LIVER SERPL-CCNC: 96 U/L
ALT SERPL-CCNC: 37 U/L
ANION GAP SERPL CALC-SCNC: 6 MMOL/L (ref 0–18)
AST SERPL-CCNC: 27 U/L (ref ?–34)
BASOPHILS # BLD AUTO: 0.06 X10(3) UL (ref 0–0.2)
BASOPHILS NFR BLD AUTO: 0.9 %
BILIRUB SERPL-MCNC: 0.7 MG/DL (ref 0.2–1.1)
BILIRUB UR QL STRIP.AUTO: NEGATIVE
BUN BLD-MCNC: 27 MG/DL (ref 9–23)
C3 SERPL-MCNC: 140.1 MG/DL (ref 90–170)
C4 SERPL-MCNC: 33.2 MG/DL (ref 12–36)
CALCIUM BLD-MCNC: 10.3 MG/DL (ref 8.7–10.4)
CHLORIDE SERPL-SCNC: 109 MMOL/L (ref 98–112)
CLARITY UR REFRACT.AUTO: CLEAR
CO2 SERPL-SCNC: 26 MMOL/L (ref 21–32)
CREAT BLD-MCNC: 0.95 MG/DL
CREAT UR-SCNC: 52.6 MG/DL
CRP SERPL-MCNC: <0.4 MG/DL (ref ?–0.5)
DEPRECATED HBV CORE AB SER IA-ACNC: 205 NG/ML
EGFRCR SERPLBLD CKD-EPI 2021: 66 ML/MIN/1.73M2 (ref 60–?)
EOSINOPHIL # BLD AUTO: 0.3 X10(3) UL (ref 0–0.7)
EOSINOPHIL NFR BLD AUTO: 4.7 %
ERYTHROCYTE [DISTWIDTH] IN BLOOD BY AUTOMATED COUNT: 13.6 %
ERYTHROCYTE [SEDIMENTATION RATE] IN BLOOD: 51 MM/HR
FASTING STATUS PATIENT QL REPORTED: YES
FOLATE SERPL-MCNC: 20 NG/ML (ref 5.4–?)
GLOBULIN PLAS-MCNC: 3.6 G/DL (ref 2–3.5)
GLUCOSE BLD-MCNC: 105 MG/DL (ref 70–99)
GLUCOSE UR STRIP.AUTO-MCNC: NORMAL MG/DL
HCT VFR BLD AUTO: 45.9 %
HGB BLD-MCNC: 14.9 G/DL
IMM GRANULOCYTES # BLD AUTO: 0.03 X10(3) UL (ref 0–1)
IMM GRANULOCYTES NFR BLD: 0.5 %
IRON SATN MFR SERPL: 31 %
IRON SERPL-MCNC: 95 UG/DL
KETONES UR STRIP.AUTO-MCNC: NEGATIVE MG/DL
LEUKOCYTE ESTERASE UR QL STRIP.AUTO: NEGATIVE
LYMPHOCYTES # BLD AUTO: 2.95 X10(3) UL (ref 1–4)
LYMPHOCYTES NFR BLD AUTO: 46 %
MCH RBC QN AUTO: 29.3 PG (ref 26–34)
MCHC RBC AUTO-ENTMCNC: 32.5 G/DL (ref 31–37)
MCV RBC AUTO: 90.4 FL
MONOCYTES # BLD AUTO: 0.5 X10(3) UL (ref 0.1–1)
MONOCYTES NFR BLD AUTO: 7.8 %
NEUTROPHILS # BLD AUTO: 2.58 X10 (3) UL (ref 1.5–7.7)
NEUTROPHILS # BLD AUTO: 2.58 X10(3) UL (ref 1.5–7.7)
NEUTROPHILS NFR BLD AUTO: 40.1 %
NITRITE UR QL STRIP.AUTO: NEGATIVE
OSMOLALITY SERPL CALC.SUM OF ELEC: 297 MOSM/KG (ref 275–295)
PH UR STRIP.AUTO: 6.5 [PH] (ref 5–8)
PLATELET # BLD AUTO: 235 10(3)UL (ref 150–450)
POTASSIUM SERPL-SCNC: 3.9 MMOL/L (ref 3.5–5.1)
PROT SERPL-MCNC: 8.1 G/DL (ref 5.7–8.2)
PROT UR STRIP.AUTO-MCNC: 70 MG/DL
PROT UR-MCNC: 103.2 MG/DL (ref ?–14)
RBC # BLD AUTO: 5.08 X10(6)UL
RBC UR QL AUTO: NEGATIVE
SODIUM SERPL-SCNC: 141 MMOL/L (ref 136–145)
SP GR UR STRIP.AUTO: 1.02 (ref 1–1.03)
THYROGLOB SERPL-MCNC: 20 U/ML (ref ?–60)
THYROPEROXIDASE AB SERPL-ACNC: 28 U/ML (ref ?–60)
TOTAL IRON BINDING CAPACITY: 311 UG/DL (ref 250–425)
TRANSFERRIN SERPL-MCNC: 244 MG/DL (ref 250–380)
URATE SERPL-MCNC: 8 MG/DL
UROBILINOGEN UR STRIP.AUTO-MCNC: NORMAL MG/DL
VIT B12 SERPL-MCNC: 167 PG/ML (ref 211–911)
WBC # BLD AUTO: 6.4 X10(3) UL (ref 4–11)

## 2024-11-29 PROCEDURE — 82607 VITAMIN B-12: CPT

## 2024-11-29 PROCEDURE — 86147 CARDIOLIPIN ANTIBODY EA IG: CPT

## 2024-11-29 PROCEDURE — 84156 ASSAY OF PROTEIN URINE: CPT

## 2024-11-29 PROCEDURE — 85732 THROMBOPLASTIN TIME PARTIAL: CPT

## 2024-11-29 PROCEDURE — 81001 URINALYSIS AUTO W/SCOPE: CPT

## 2024-11-29 PROCEDURE — 86160 COMPLEMENT ANTIGEN: CPT

## 2024-11-29 PROCEDURE — 86146 BETA-2 GLYCOPROTEIN ANTIBODY: CPT

## 2024-11-29 PROCEDURE — 86038 ANTINUCLEAR ANTIBODIES: CPT

## 2024-11-29 PROCEDURE — 82746 ASSAY OF FOLIC ACID SERUM: CPT

## 2024-11-29 PROCEDURE — 85652 RBC SED RATE AUTOMATED: CPT

## 2024-11-29 PROCEDURE — 83516 IMMUNOASSAY NONANTIBODY: CPT

## 2024-11-29 PROCEDURE — 82728 ASSAY OF FERRITIN: CPT

## 2024-11-29 PROCEDURE — 36415 COLL VENOUS BLD VENIPUNCTURE: CPT

## 2024-11-29 PROCEDURE — 82570 ASSAY OF URINE CREATININE: CPT

## 2024-11-29 PROCEDURE — 86225 DNA ANTIBODY NATIVE: CPT

## 2024-11-29 PROCEDURE — 86376 MICROSOMAL ANTIBODY EACH: CPT

## 2024-11-29 PROCEDURE — 86200 CCP ANTIBODY: CPT

## 2024-11-29 PROCEDURE — 85025 COMPLETE CBC W/AUTO DIFF WBC: CPT

## 2024-11-29 PROCEDURE — 83550 IRON BINDING TEST: CPT

## 2024-11-29 PROCEDURE — 85613 RUSSELL VIPER VENOM DILUTED: CPT

## 2024-11-29 PROCEDURE — 86800 THYROGLOBULIN ANTIBODY: CPT

## 2024-11-29 PROCEDURE — 84550 ASSAY OF BLOOD/URIC ACID: CPT

## 2024-11-29 PROCEDURE — 86140 C-REACTIVE PROTEIN: CPT

## 2024-11-29 PROCEDURE — 85610 PROTHROMBIN TIME: CPT

## 2024-11-29 PROCEDURE — 85705 THROMBOPLASTIN INHIBITION: CPT

## 2024-11-29 PROCEDURE — 80053 COMPREHEN METABOLIC PANEL: CPT

## 2024-11-29 PROCEDURE — 83540 ASSAY OF IRON: CPT

## 2024-11-30 ENCOUNTER — TELEPHONE (OUTPATIENT)
Dept: RHEUMATOLOGY | Facility: CLINIC | Age: 69
End: 2024-11-30

## 2024-11-30 DIAGNOSIS — R80.1 PERSISTENT PROTEINURIA: Primary | ICD-10-CM

## 2024-11-30 DIAGNOSIS — M1A.9XX1 TOPHUS OF RIGHT HAND DUE TO GOUT: ICD-10-CM

## 2024-11-30 DIAGNOSIS — R76.8 POSITIVE ANA (ANTINUCLEAR ANTIBODY): ICD-10-CM

## 2024-12-02 LAB
B2 GLYCOPROT1 IGG SERPL IA-ACNC: <0.8 U/ML (ref ?–7)
B2 GLYCOPROT1 IGM SERPL IA-ACNC: <2.4 U/ML (ref ?–7)
CARDIOLIPIN IGG SERPL-ACNC: 5.9 GPL (ref ?–10)
CARDIOLIPIN IGM SERPL-ACNC: <0.9 MPL (ref ?–10)
CCP IGG SERPL-ACNC: 1.3 U/ML (ref 0–6.9)
DSDNA IGG SERPL IA-ACNC: 1.1 IU/ML
ENA AB SER QL IA: 0.1 UG/L
ENA AB SER QL IA: NEGATIVE

## 2024-12-03 LAB
APTT PPP: 31.8 SECONDS (ref 23–36)
INR BLD: 0.88 (ref 0.85–1.16)
LA 3 SCREEN W REFLEX-IMP: NEGATIVE
PROTHROMBIN TIME: 12.4 SECONDS (ref 11.6–14.8)
SCREEN DRVVT: 1.13 S (ref 0–1.29)
SCREEN DRVVT: NEGATIVE S
STACLOT LA DELTA: 1.5 SECONDS (ref ?–8)

## 2024-12-04 ENCOUNTER — PATIENT MESSAGE (OUTPATIENT)
Dept: RHEUMATOLOGY | Facility: CLINIC | Age: 69
End: 2024-12-04

## 2024-12-04 DIAGNOSIS — R79.89 LOW VITAMIN B12 LEVEL: Primary | ICD-10-CM

## 2024-12-04 DIAGNOSIS — Z13.9 ASIAN ANCESTRY REQUIRING POPULATION-SPECIFIC GENETIC SCREENING: Primary | ICD-10-CM

## 2024-12-05 LAB — ANTI-RNA POLYMERASE III: <20 UNITS

## 2024-12-05 NOTE — TELEPHONE ENCOUNTER
Message patient. Please have her discuss B12 question with PCP. That is not pertinent to rheumatology.    Given ethnic background, recommend genetic testing. Certain genes can increase risk of gout treatment allergies (allopurinol). Need to start PA for:    [facility]:   HLA B*58:[id number], Allopurinol Hypersensitivity  TEST: [id number]  CPT: 85013    Once PA is approved, have patient get the lab test. Once lab test is back, will f/u with the patient.

## 2024-12-17 ENCOUNTER — HOSPITAL ENCOUNTER (OUTPATIENT)
Dept: GENERAL RADIOLOGY | Age: 69
Discharge: HOME | End: 2024-12-17
Attending: INTERNAL MEDICINE
Payer: COMMERCIAL

## 2024-12-17 DIAGNOSIS — M94.0 COSTOCHONDRITIS: ICD-10-CM

## 2024-12-17 PROCEDURE — 71111 X-RAY EXAM RIBS/CHEST4/> VWS: CPT | Performed by: INTERNAL MEDICINE

## 2025-01-02 ENCOUNTER — TELEPHONE (OUTPATIENT)
Facility: CLINIC | Age: 70
End: 2025-01-02

## 2025-01-02 ENCOUNTER — TELEMEDICINE (OUTPATIENT)
Dept: RHEUMATOLOGY | Facility: CLINIC | Age: 70
End: 2025-01-02
Payer: COMMERCIAL

## 2025-01-02 DIAGNOSIS — M10.072 ACUTE IDIOPATHIC GOUT OF LEFT FOOT: Primary | ICD-10-CM

## 2025-01-02 DIAGNOSIS — Z13.9 ASIAN ANCESTRY REQUIRING POPULATION-SPECIFIC GENETIC SCREENING: ICD-10-CM

## 2025-01-02 DIAGNOSIS — M79.672 LEFT FOOT PAIN: ICD-10-CM

## 2025-01-02 PROCEDURE — 98006 SYNCH AUDIO-VIDEO EST MOD 30: CPT | Performed by: INTERNAL MEDICINE

## 2025-01-02 RX ORDER — GLUCOSAMINE/D3/BOSWELLIA SERRA 1500MG-400
TABLET ORAL
COMMUNITY

## 2025-01-02 RX ORDER — PREDNISONE 5 MG/1
TABLET ORAL
Qty: 32 TABLET | Refills: 0 | Status: SHIPPED | OUTPATIENT
Start: 2025-01-02 | End: 2025-01-12

## 2025-01-02 RX ORDER — PREDNISONE 5 MG/1
TABLET ORAL
Qty: 32 TABLET | Refills: 0 | Status: SHIPPED | OUTPATIENT
Start: 2025-01-02 | End: 2025-01-02

## 2025-01-02 NOTE — PROGRESS NOTES
Rheumatology f/u Patient Note  =====================================================================================================    Chief complaint: +TORI  No chief complaint on file.    PCP  Irina Calix MD  Fax: [phone]  Phone: [phone]  =====================================================================================================  HPI   Date of visit: 10/31/2024  Zhane Frederick is a 69 year old female   +TORI evaluation.  -Cardiac symptoms, PCP ruled out cardiac issues. TTE wnl.   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  -hx of bunion surgery on the right.   -Dry eye is noted.   Denies current alopecia, malar rash, photosensitivity rash, discoid lesions, oral/nasal ulcers, pleuritic chest pain, arthritis, seizures/psychosis, Raynaud's, dry mouth, or blood clots.  1 children:  -miscarriages: x 6. Unclear cause  Denies miscarriages or obstetric events (early pre-eclampsia, IUGR, placental insufficiency)  -Prior pregnancies and/or children:  --Pregnancy complications  ==============================================================================================================  Today's Visit: 01/02/25    Left foot swelling noted 12/31, worsened yesterday.      5 point ROS negative except noted above  I had reviewed past medical and family histories together with allergy and medication lists documented.      Medications:  Medications Ordered Prior to Encounter[1]  ?  Allergies:  Allergies[2]      Objective    There were no vitals filed for this visit.    GEN: NAD, well-nourished.   HEENT: Head: NCAT. Face: No lesions. Eyes: Conjunctiva clear.   PULM:  easy effort  Extremities: No cyanosis, edema or deformities.   Neurologic: Strength, CN2-12 grossly intact   Skin: No lesions or rashes.  MSK:     Left lateral/midfoot with swelling and erythema.       Labs:  Lab Results   Component Value Date    URIC 8.0 (H) 11/29/2024    URIC 9.1 (H) 08/13/2022        5/2024  TORI 1: 320 homogenous  1: 80 nucleolar  CRP negative  RF negative  Sed rate 63  Vitamin D, TSH WNL      Lab Results   Component Value Date    WBC 6.4 11/29/2024    RBC 5.08 11/29/2024    HGB 14.9 11/29/2024    HCT 45.9 11/29/2024    .0 11/29/2024    MPV 10.6 03/02/2013    MCV 90.4 11/29/2024    MCH 29.3 11/29/2024    MCHC 32.5 11/29/2024    RDW 13.6 11/29/2024    NEPRELIM 2.58 11/29/2024    NEPERCENT 40.1 11/29/2024    LYPERCENT 46.0 11/29/2024    MOPERCENT 7.8 11/29/2024    EOPERCENT 4.7 11/29/2024    BAPERCENT 0.9 11/29/2024    NE 2.58 11/29/2024    LYMABS 2.95 11/29/2024    MOABSO 0.50 11/29/2024    EOABSO 0.30 11/29/2024    BAABSO 0.06 11/29/2024     Lab Results   Component Value Date     (H) 11/29/2024    BUN 27 (H) 11/29/2024    BUNCREA 27.9 (H) 05/30/2024    CREATSERUM 0.95 11/29/2024    ANIONGAP 6 11/29/2024    GFR 66 11/12/2016    GFRNAA 66 04/01/2022    GFRAA 76 04/01/2022    CA 10.3 11/29/2024    OSMOCALC 297 (H) 11/29/2024    ALKPHO 96 11/29/2024    AST 27 11/29/2024    ALT 37 11/29/2024    BILT 0.7 11/29/2024    TP 8.1 11/29/2024    ALB 4.5 11/29/2024    GLOBULIN 3.6 (H) 11/29/2024     11/29/2024    K 3.9 11/29/2024     11/29/2024    CO2 26.0 11/29/2024         Lab Results   Component Value Date    ANASCRN Positive (A) 05/30/2024     No results found for: \"SSA\", \"SSAUR\", \"ANTISSA\", \"SSA52\", \"SSA60\", \"SSADD\", \"SSB\", \"ANTISSB\"  No results found for: \"DSDNA\", \"ANTIDSDNA\", \"SMUD\", \"ANTISM\", \"SM\", \"RNP\", \"ANTIRNP\", \"SMITHRNP\"  No results found for: \"SCL70\", \"SCL\", \"GKHFYNL33\"  Lab Results   Component Value Date    C3 140.1 11/29/2024    C4 33.2 11/29/2024     Lab Results   Component Value Date    DRVVT Negative 11/29/2024    [id number] <0.8 11/29/2024    [id number] <2.4 11/29/2024     Lab Results   Component Value Date    CARDIOLIPIGG 5.9 11/29/2024    CARDIOLIPIGM <0.9 11/29/2024       Additional Labs:    Radiology:    Radiology review:       =====================================================================================================  Assessment and Plan  Assessment:  1. Acute idiopathic gout of left foot    2. Left foot pain    3.  ancestry requiring population-specific genetic screening      #Gout:  -clinical diagnosis based on recurrent midfoot erythema/swelling  -Hyperuricemia noted   -bilateral hand arthralgia. Pannus is confounding, however thickening overlying the MCPs and PIPs raises concern for some sort of crystalline arthritis deposition process  -Extensive discussion of pathophysiology of gout today.  Discussed gout is often caused by diminished renal handling of urate, resultant hyperuricemia, and subsequent deposition of monosodium urate crystals into articular structures.  Management of gouty arthropathy is twofold: 1) treatment of acute gout flares with NSAIDs, colchicine, corticosteroids, and/or anakinra; 2) dissolution of MSU crystals by urate lowering therapy (ULT).     #Positive TORI:  -dry eye but no specific features of a systemic autoimmune disease  -suspect this may be a false positive  -6x miscarriages in the past  -Nucleolar pattern can be seen in scleroderma but the patient has no history of Raynaud's, sclerodactyly, skin thickening to suggest  systemic sclerosis.     Plan:  Get HLA  genetic test. If normal, can proceed with allopurinol. Has chinese ancestry  Get X-ray of the foot of the left foot to r/o fracture.     Prednisone taper given (only as needed for gout flares):  take for gout flares.   6 tabs (30 mg) daily for 2 days  THEN 4 tabs (20 mg) daily for 2 days,   THEN 3 tabs (15 mg) daily for 2 day  THEN 2 tabs (10 mg ) daily for 2 days  THEN 1 tablet (5 mg total) daily for 2 days.    Allopurinol uptitration (This will cure gout): patient hesistant about maintenance medications however.   --1/2 tablet (50 mg total) daily for 30 days  1 tablet (100 mg total) daily for 30 days  THEN 2 tablets (200 mg  total) daily for 30 days  THEN 3 tablets (300 mg total) daily. After 30 days of being on this dose, repeat bloodwork. Including a comprehensive metabolic panel (CMP) and uric acid    Repeat blood work in May 2025, including B12 level.       Called patient via number listed in chart today    Original appnt date: Today's Visit: 01/02/25      This video telehealth visit (with Version[employer] Video ) was conducted in lieu of a previously scheduled clinic visit because of the COVID-19 pandemic. The patient or patient guardian verbally consented to virtual/remote treatment. All medical decision making was made in conjunction with the patient. This telehealth visit was initiated by the patient or patient guardian given the in-person appointment time as above who was located at [home]. The patient presented alone. Risks and benefits of therapy recommended, and potential side effects of all medications prescribed were discussed.  Patient was counseled on symptoms that would necessitate more emergency follow up.     The patient was within the [facility] during our visit.     Patient understands and accepts financial responsibility for any deductible, coinsurance and/or co-pays associated with the service. The patient understands that the physical exam will be limited.    This telehealth visit was performed while I was physically located in a   Medical office building in [place]. [address], [place] [address]        Diagnoses and all orders for this visit:    Acute idiopathic gout of left foot  -     XR FOOT, COMPLETE (MIN 3 VIEWS), LEFT (CPT=73630); Future  -     Discontinue: predniSONE 5 MG Oral Tab; Take 6 tablets (30 mg total) by mouth daily for 2 days, THEN 4 tablets (20 mg total) daily for 2 days, THEN 3 tablets (15 mg total) daily for 2 days, THEN 2 tablets (10 mg total) daily for 2 days, THEN 1 tablet (5 mg total) daily for 2 days.  -     Comp Metabolic Panel (14); Future  -     CBC With Differential With  Platelet; Future  -     Vitamin B12; Future  -     Uric Acid; Future  -     predniSONE 5 MG Oral Tab; Take 6 tablets (30 mg total) by mouth daily for 2 days, THEN 4 tablets (20 mg total) daily for 2 days, THEN 3 tablets (15 mg total) daily for 2 days, THEN 2 tablets (10 mg total) daily for 2 days, THEN 1 tablet (5 mg total) daily for 2 days.    Left foot pain  -     XR FOOT, COMPLETE (MIN 3 VIEWS), LEFT (CPT=73630); Future  -     Discontinue: predniSONE 5 MG Oral Tab; Take 6 tablets (30 mg total) by mouth daily for 2 days, THEN 4 tablets (20 mg total) daily for 2 days, THEN 3 tablets (15 mg total) daily for 2 days, THEN 2 tablets (10 mg total) daily for 2 days, THEN 1 tablet (5 mg total) daily for 2 days.  -     Comp Metabolic Panel (14); Future  -     CBC With Differential With Platelet; Future  -     Vitamin B12; Future  -     Uric Acid; Future  -     predniSONE 5 MG Oral Tab; Take 6 tablets (30 mg total) by mouth daily for 2 days, THEN 4 tablets (20 mg total) daily for 2 days, THEN 3 tablets (15 mg total) daily for 2 days, THEN 2 tablets (10 mg total) daily for 2 days, THEN 1 tablet (5 mg total) daily for 2 days.     ancestry requiring population-specific genetic screening        No follow-ups on file.      The above plan of care, diagnosis, orders, and follow-up were discussed with the patient. Questions related to this recommended plan of care were answered.    Thank you for referring this delightful patient to me. Please feel free to contact me with any questions.     This report was performed utilizing speech recognition software technology. Despite proofreading, speech recognition errors could escape detection. If a word or phrase is confusing or out of context, please do not hesitate to call for   clarification.       Kind regards      Brendan Pack MD  [facility]         [1]   Current Outpatient Medications on File Prior to Visit   Medication Sig Dispense Refill    Cyanocobalamin 1000 MCG Oral Cap  Take 1 capsule by mouth daily.      valsartan 160 MG Oral Tab Take 1 tablet (160 mg total) by mouth daily. 90 tablet 0    amLODIPine 2.5 MG Oral Tab Take 1 tablet (2.5 mg total) by mouth daily. 90 tablet 0    amoxicillin 500 MG Oral Cap Take 4 capsules (2,000 mg total) by mouth as needed. 1 HR BEFORE DENTAL APPT      rosuvastatin 10 MG Oral Tab Take 1 tablet (10 mg total) by mouth nightly. 90 tablet 0    cholecalciferol (VITAMIN D3) 125 MCG (5000 UT) Oral Cap Take 1 capsule (5,000 Units total) by mouth daily.      Omega 3 1000 MG Oral Cap Take 2 capsules by mouth daily.      Zinc 50 MG Oral Tab Take 1 tablet by mouth daily.      MAGNESIUM GLYCINATE OR Take 1 tablet by mouth daily.       No current facility-administered medications on file prior to visit.   [2]   Allergies  Allergen Reactions    Niaspan [Niacin]      Face flushed    Simvastatin      Muscle aches  pravach ok      Seasonal ITCHING

## 2025-01-02 NOTE — PATIENT INSTRUCTIONS
Get HLA  genetic test. If normal, can proceed with allopurinol  Get X-ray of the foot.    Prednisone taper given (only as needed for gout flares):  take for gout flares.   6 tabs (30 mg) daily for 2 days  THEN 4 tabs (20 mg) daily for 2 days,   THEN 3 tabs (15 mg) daily for 2 day  THEN 2 tabs (10 mg ) daily for 2 days  THEN 1 tablet (5 mg total) daily for 2 days.    Allopurinol uptitration (This will cure gout):  --1/2 tablet (50 mg total) daily for 30 days  1 tablet (100 mg total) daily for 30 days  THEN 2 tablets (200 mg total) daily for 30 days  THEN 3 tablets (300 mg total) daily. After 30 days of being on this dose, repeat bloodwork. Including a comprehensive metabolic panel (CMP) and uric acid    Repeat blood work in May 2025, including B12 level.

## 2025-01-03 ENCOUNTER — PATIENT MESSAGE (OUTPATIENT)
Dept: INTERNAL MEDICINE CLINIC | Facility: CLINIC | Age: 70
End: 2025-01-03

## 2025-01-04 ENCOUNTER — HOSPITAL ENCOUNTER (OUTPATIENT)
Dept: GENERAL RADIOLOGY | Age: 70
Discharge: HOME | End: 2025-01-04
Attending: INTERNAL MEDICINE
Payer: COMMERCIAL

## 2025-01-04 ENCOUNTER — LAB ENCOUNTER (OUTPATIENT)
Dept: LAB | Age: 70
End: 2025-01-04
Attending: INTERNAL MEDICINE
Payer: COMMERCIAL

## 2025-01-04 DIAGNOSIS — Z13.9 ASIAN ANCESTRY REQUIRING POPULATION-SPECIFIC GENETIC SCREENING: ICD-10-CM

## 2025-01-04 DIAGNOSIS — M79.672 LEFT FOOT PAIN: ICD-10-CM

## 2025-01-04 DIAGNOSIS — M10.072 ACUTE IDIOPATHIC GOUT OF LEFT FOOT: ICD-10-CM

## 2025-01-04 PROCEDURE — 36415 COLL VENOUS BLD VENIPUNCTURE: CPT

## 2025-01-04 PROCEDURE — 81381 HLA I TYPING 1 ALLELE HR: CPT

## 2025-01-04 PROCEDURE — 73630 X-RAY EXAM OF FOOT: CPT | Performed by: INTERNAL MEDICINE

## 2025-01-14 ENCOUNTER — TELEPHONE (OUTPATIENT)
Facility: CLINIC | Age: 70
End: 2025-01-14

## 2025-01-14 DIAGNOSIS — Z51.81 THERAPEUTIC DRUG MONITORING: ICD-10-CM

## 2025-01-14 DIAGNOSIS — M10.072 ACUTE IDIOPATHIC GOUT OF LEFT FOOT: Primary | ICD-10-CM

## 2025-01-14 RX ORDER — ALLOPURINOL 100 MG/1
TABLET ORAL
Qty: 255 TABLET | Refills: 1 | Status: SHIPPED | OUTPATIENT
Start: 2025-01-14 | End: 2025-07-13

## 2025-01-14 RX ORDER — PREDNISONE 5 MG/1
TABLET ORAL
Qty: 32 TABLET | Refills: 1 | Status: SHIPPED | OUTPATIENT
Start: 2025-01-14 | End: 2025-01-24

## 2025-01-14 NOTE — TELEPHONE ENCOUNTER
MCM sent stating to  at local pharmacy for now and to contact the office when due for next fill. Awaiting possible response.

## 2025-01-14 NOTE — TELEPHONE ENCOUNTER
Pt called the office stating prescription came from 2 different pharmacys for both prednisone and allopurinol. Confirmed it was sent to [facility] in [place], but would like it switched to mail order. Is willing to  both at [facility].     Dr Pack-- see above and advise.

## 2025-01-15 RX ORDER — ALLOPURINOL 100 MG/1
TABLET ORAL
Qty: 235 TABLET | Refills: 1 | Status: SHIPPED | OUTPATIENT
Start: 2025-01-15 | End: 2025-01-20

## 2025-01-15 NOTE — TELEPHONE ENCOUNTER
Pt called back the office stating she is \"having trouble\" picking up the allopurinol from [facility] and would prefer for it sent to the mail order pharmacy. Pt has already canceled prescription through [facility].     Dr Pack-- see above and advise; orders pending, approve if agreeable.

## 2025-01-20 RX ORDER — ALLOPURINOL 100 MG/1
TABLET ORAL
Qty: 235 TABLET | Refills: 1 | Status: SHIPPED | OUTPATIENT
Start: 2025-01-20

## 2025-02-11 ENCOUNTER — TELEPHONE (OUTPATIENT)
Dept: INTERNAL MEDICINE CLINIC | Facility: CLINIC | Age: 70
End: 2025-02-11

## 2025-02-11 DIAGNOSIS — I10 ESSENTIAL HYPERTENSION: ICD-10-CM

## 2025-02-11 DIAGNOSIS — Z12.31 SCREENING MAMMOGRAM, ENCOUNTER FOR: Primary | ICD-10-CM

## 2025-02-11 NOTE — TELEPHONE ENCOUNTER
Patient sent Etherpad request for an order for screening mammogram.     Please send Etherpad message once ordered.

## 2025-02-12 RX ORDER — VALSARTAN 160 MG/1
160 TABLET ORAL DAILY
Qty: 90 TABLET | Refills: 0 | OUTPATIENT
Start: 2025-02-12

## 2025-02-12 RX ORDER — AMLODIPINE BESYLATE 2.5 MG/1
2.5 TABLET ORAL DAILY
Qty: 90 TABLET | Refills: 0 | OUTPATIENT
Start: 2025-02-12

## 2025-02-12 RX ORDER — ROSUVASTATIN CALCIUM 10 MG/1
10 TABLET, COATED ORAL NIGHTLY
Qty: 90 TABLET | Refills: 0 | OUTPATIENT
Start: 2025-02-12

## 2025-02-12 NOTE — TELEPHONE ENCOUNTER
Requesting    rosuvastatin 10 MG Oral Tab         Sig: Take 1 tablet (10 mg total) by mouth nightly.    Disp: 90 tablet    Refills: 0    Start: 2/11/2025    Class: Normal    Last ordered: 3 months ago (10/24/2024) by Irina Calix MD    Cholesterol Medication Protocol [id number]/11/2025 11:20 PM   Protocol Details ALT < 80    ALT resulted within past year    Lipid panel within past 12 months    In person appointment or virtual visit in the past 12 mos or appointment in next 3 mos    Medication is active on med list       valsartan 160 MG Oral Tab         Sig: Take 1 tablet (160 mg total) by mouth daily.    Disp: 90 tablet    Refills: 0    Start: 2/11/2025    Class: Normal    Last ordered: 2 months ago (11/19/2024) by Irina Calix MD    Hypertension Medications Protocol Ytudto0702/11/2025 11:20 PM   Protocol Details CMP or BMP in past 12 months    Last BP reading less than 140/90    In person appointment or virtual visit in the past 12 mos or appointment in next 3 mos    EGFRCR or GFRNAA > 50    Medication is active on med list       amLODIPine 2.5 MG Oral Tab         Sig: Take 1 tablet (2.5 mg total) by mouth daily.    Disp: 90 tablet    Refills: 0    Start: 2/11/2025    Class: Normal    For: Essential hypertension    Last ordered: 2 months ago (11/19/2024) by Irina Calix MD    Hypertension Medications Protocol Woppeb6702/11/2025 11:20 PM   Protocol Details CMP or BMP in past 12 months    Last BP reading less than 140/90    In person appointment or virtual visit in the past 12 mos or appointment in next 3 mos    EGFRCR or GFRNAA > 50    Medication is active on med list        LOV: 8/29/2024  RTC: 1 year   Last Relevant Labs: 5/30/2024  Filled:   Rosuvastatin-10/24/2024 #90 with 0 refills  Valsartan-11/19/2024 #90 with 0 refills  Amlodipine-11/19/2024 #90 with 0 refills    No future appointments.

## 2025-02-25 ENCOUNTER — PATIENT MESSAGE (OUTPATIENT)
Dept: INTERNAL MEDICINE CLINIC | Facility: CLINIC | Age: 70
End: 2025-02-25

## 2025-02-25 DIAGNOSIS — I10 ESSENTIAL HYPERTENSION: ICD-10-CM

## 2025-02-25 RX ORDER — AMLODIPINE BESYLATE 2.5 MG/1
2.5 TABLET ORAL DAILY
Qty: 90 TABLET | Refills: 0 | Status: SHIPPED | OUTPATIENT
Start: 2025-02-25

## 2025-02-25 RX ORDER — ROSUVASTATIN CALCIUM 10 MG/1
10 TABLET, COATED ORAL NIGHTLY
Qty: 90 TABLET | Refills: 0 | Status: SHIPPED | OUTPATIENT
Start: 2025-02-25

## 2025-02-25 RX ORDER — VALSARTAN 160 MG/1
160 TABLET ORAL DAILY
Qty: 90 TABLET | Refills: 0 | Status: SHIPPED | OUTPATIENT
Start: 2025-02-25

## 2025-02-25 NOTE — TELEPHONE ENCOUNTER
Refills passed protocol however, pt is over due for 6 month follow up.   [facility] sent to pt to schedule an appt  Refill sent in for time being.

## 2025-02-25 NOTE — TELEPHONE ENCOUNTER
MA: See MCM from today and refill request from 2/11/2025. These meds passed the protocol check to be refilled. Not sure why they haven't been refilled? Please advise, TY    From 2/11:  Nina Quinteros MA   MM    2/12/25  7:43 AM  Note     Requesting         rosuvastatin 10 MG Oral Tab             Sig: Take 1 tablet (10 mg total) by mouth nightly.     Disp: 90 tablet    Refills: 0     Start: 2/11/2025     Class: Normal     Last ordered: 3 months ago (10/24/2024) by Irina Calix MD     Cholesterol Medication Protocol Yvydpr7402/11/2025 11:20 PM   Protocol Details ALT < 80    ALT resulted within past year    Lipid panel within past 12 months    In person appointment or virtual visit in the past 12 mos or appointment in next 3 mos    Medication is active on med list       valsartan 160 MG Oral Tab             Sig: Take 1 tablet (160 mg total) by mouth daily.     Disp: 90 tablet    Refills: 0     Start: 2/11/2025     Class: Normal     Last ordered: 2 months ago (11/19/2024) by Irina Calix MD     Hypertension Medications Protocol [id number]/11/2025 11:20 PM   Protocol Details CMP or BMP in past 12 months    Last BP reading less than 140/90    In person appointment or virtual visit in the past 12 mos or appointment in next 3 mos    EGFRCR or GFRNAA > 50    Medication is active on med list       amLODIPine 2.5 MG Oral Tab             Sig: Take 1 tablet (2.5 mg total) by mouth daily.     Disp: 90 tablet    Refills: 0     Start: 2/11/2025     Class: Normal     For: Essential hypertension     Last ordered: 2 months ago (11/19/2024) by Irina Calix MD     Hypertension Medications Protocol Bieqeq7002/11/2025 11:20 PM   Protocol Details CMP or BMP in past 12 months    Last BP reading less than 140/90    In person appointment or virtual visit in the past 12 mos or appointment in next 3 mos    EGFRCR or GFRNAA > 50    Medication is active on med list         LOV: 8/29/2024  RTC: 1 year   Last Relevant Labs: 5/30/2024  Filled:    Rosuvastatin-10/24/2024 #90 with 0 refills  Valsartan-11/19/2024 #90 with 0 refills  Amlodipine-11/19/2024 #90 with 0 refills     No future appointments.

## 2025-04-05 ENCOUNTER — HOSPITAL ENCOUNTER (OUTPATIENT)
Dept: MAMMOGRAPHY | Age: 70
Discharge: HOME | End: 2025-04-05
Attending: FAMILY MEDICINE
Payer: COMMERCIAL

## 2025-04-05 DIAGNOSIS — Z12.31 SCREENING MAMMOGRAM, ENCOUNTER FOR: ICD-10-CM

## 2025-04-05 PROCEDURE — 77063 BREAST TOMOSYNTHESIS BI: CPT | Performed by: FAMILY MEDICINE

## 2025-04-05 PROCEDURE — 77067 SCR MAMMO BI INCL CAD: CPT | Performed by: FAMILY MEDICINE

## 2025-04-07 ENCOUNTER — OFFICE VISIT (OUTPATIENT)
Dept: INTERNAL MEDICINE CLINIC | Facility: CLINIC | Age: 70
End: 2025-04-07
Payer: COMMERCIAL

## 2025-04-07 VITALS
BODY MASS INDEX: 28.32 KG/M2 | HEART RATE: 90 BPM | SYSTOLIC BLOOD PRESSURE: 145 MMHG | OXYGEN SATURATION: 97 % | HEIGHT: 59.5 IN | DIASTOLIC BLOOD PRESSURE: 90 MMHG | TEMPERATURE: 98 F | WEIGHT: 142.38 LBS

## 2025-04-07 DIAGNOSIS — M10.9 GOUT, UNSPECIFIED CAUSE, UNSPECIFIED CHRONICITY, UNSPECIFIED SITE: ICD-10-CM

## 2025-04-07 DIAGNOSIS — R73.01 IMPAIRED FASTING GLUCOSE: ICD-10-CM

## 2025-04-07 DIAGNOSIS — I71.21 ANEURYSM OF ASCENDING AORTA WITHOUT RUPTURE: ICD-10-CM

## 2025-04-07 DIAGNOSIS — I71.40 ABDOMINAL AORTIC ANEURYSM (AAA) WITHOUT RUPTURE, UNSPECIFIED PART: ICD-10-CM

## 2025-04-07 DIAGNOSIS — R22.1 NECK SWELLING: ICD-10-CM

## 2025-04-07 DIAGNOSIS — E78.00 PURE HYPERCHOLESTEROLEMIA: ICD-10-CM

## 2025-04-07 DIAGNOSIS — E53.8 B12 DEFICIENCY: ICD-10-CM

## 2025-04-07 DIAGNOSIS — I10 ESSENTIAL HYPERTENSION: Primary | ICD-10-CM

## 2025-04-07 DIAGNOSIS — Z78.0 POSTMENOPAUSAL ESTROGEN DEFICIENCY: ICD-10-CM

## 2025-04-07 PROBLEM — E11.9 TYPE 2 DIABETES MELLITUS WITHOUT COMPLICATION, WITHOUT LONG-TERM CURRENT USE OF INSULIN (HCC): Status: RESOLVED | Noted: 2020-06-19 | Resolved: 2025-04-07

## 2025-04-07 PROCEDURE — 99215 OFFICE O/P EST HI 40 MIN: CPT | Performed by: FAMILY MEDICINE

## 2025-04-07 PROCEDURE — 3077F SYST BP >= 140 MM HG: CPT | Performed by: FAMILY MEDICINE

## 2025-04-07 PROCEDURE — 3080F DIAST BP >= 90 MM HG: CPT | Performed by: FAMILY MEDICINE

## 2025-04-07 PROCEDURE — 3008F BODY MASS INDEX DOCD: CPT | Performed by: FAMILY MEDICINE

## 2025-04-07 RX ORDER — AMLODIPINE BESYLATE 5 MG/1
5 TABLET ORAL DAILY
Qty: 90 TABLET | Refills: 0 | Status: SHIPPED | OUTPATIENT
Start: 2025-04-07

## 2025-04-07 NOTE — PROGRESS NOTES
Zhane Frederick is a 69 year old female.  HPI:   Here to go over few issues     Worried about her anuerysm that was noted on the US and ECHO     Worried about her weight  Does not feel she has DM    wants it removed    Asking about the urine acid    Check the left neck    feels more swollen on the L   not new   has seen ENT at another hospital yrs ago for it     Did have TB on the R gland in the 80s    had sx for it as a result     no pain on the left side neck       Home BP in the past has been good   no recent check though    On BP meds         Current Outpatient Medications   Medication Sig Dispense Refill    amLODIPine 5 MG Oral Tab Take 1 tablet (5 mg total) by mouth daily. 90 tablet 0    rosuvastatin 10 MG Oral Tab Take 1 tablet (10 mg total) by mouth nightly. 90 tablet 0    valsartan 160 MG Oral Tab Take 1 tablet (160 mg total) by mouth daily. 90 tablet 0    allopurinol 100 MG Oral Tab Take 0.5 tablets (50 mg total) by mouth daily for 30 days, THEN 1 tablet (100 mg total) daily for 30 days, THEN 2 tablets (200 mg total) daily for 30 days, THEN 3 tablets (300 mg total) daily. 235 tablet 1    allopurinol 100 MG Oral Tab Take 0.5 tablets (50 mg total) by mouth daily for 30 days, THEN 1 tablet (100 mg total) daily for 30 days, THEN 2 tablets (200 mg total) daily for 30 days, THEN 3 tablets (300 mg total) daily. 255 tablet 1    Biotin 18354 MCG Oral Tab Take by mouth. Three times weekly      Cyanocobalamin 1000 MCG Oral Cap Take 1 capsule by mouth daily.      amoxicillin 500 MG Oral Cap Take 4 capsules (2,000 mg total) by mouth as needed. 1 HR BEFORE DENTAL APPT      cholecalciferol (VITAMIN D3) 125 MCG (5000 UT) Oral Cap Take 1 capsule (5,000 Units total) by mouth daily.      Omega 3 1000 MG Oral Cap Take 2 capsules by mouth daily.      Zinc 50 MG Oral Tab Take 1 tablet by mouth daily.      MAGNESIUM GLYCINATE OR Take 1 tablet by mouth daily.        Past Medical History:    Allergic rhinitis    Arthritis    Benign  neoplasm of colon    Essential hypertension    Controlled by medications    Hyperlipidemia    LDL elevated    Osteoarthritis    Had bilateral TKR 2012 & 2018    Pure hypercholesterolemia      Social History:  Social History     Socioeconomic History    Marital status:    Tobacco Use    Smoking status: Never    Smokeless tobacco: Never   Vaping Use    Vaping status: Never Used   Substance and Sexual Activity    Alcohol use: Never    Drug use: Never    Sexual activity: Never   Other Topics Concern    Caffeine Concern Yes     Comment: 1 cup of coffee daily.    Stress Concern Yes    Weight Concern No    Special Diet Yes    Exercise No    Seat Belt Yes     Social Drivers of Health     Food Insecurity: No Food Insecurity (4/7/2025)    NCSS - Food Insecurity     Worried About Running Out of Food in the Last Year: No     Ran Out of Food in the Last Year: No   Transportation Needs: No Transportation Needs (4/7/2025)    NCSS - Transportation     Lack of Transportation: No   Housing Stability: Not At Risk (4/7/2025)    NCSS - Housing/Utilities     Has Housing: Yes     Worried About Losing Housing: No     Unable to Get Utilities: No        REVIEW OF SYSTEMS:   GENERAL HEALTH: feels well otherwise  SKIN: denies rashes  RESPIRATORY: denies shortness of breath but was wheezing      EXAM:   /90   Pulse 90   Temp 98 °F (36.7 °C) (Temporal)   Ht 4' 11.5\" (1.511 m)   Wt 142 lb 6.4 oz (64.6 kg)   SpO2 97%   BMI 28.28 kg/m²   GENERAL: well developed, well nourished,in no apparent distress  SKIN: no rashes  NECK: supple,no adenopathy   the left submandibular area is more prominent that the R but no masses noted    LUNGS: clear to auscultation   no wheeze  CARDIO: RRR without murmur  EXTREMITIES: no edema    ASSESSMENT AND PLAN:   1. Essential hypertension  Not controlled    increase norvasc to 5    rec check home #s      - amLODIPine 5 MG Oral Tab; Take 1 tablet (5 mg total) by mouth daily.  Dispense: 90 tablet;  Refill: 0    2. Pure hypercholesterolemia  On meds    check labs soon        3. B12 deficiency  On otc B12 1000 daily   yvette soon         4. Gout, unspecified cause, unspecified chronicity, unspecified site  On allopurinol from Dr Pack        5. Abdominal aortic aneurysm (AAA) without rupture, unspecified part  Discussed the US    yvette in June    discussed importance of good BP control        6. Aneurysm of ascending aorta without rupture  Noted on ECHO     yvette Sept    7. Postmenopausal estrogen deficiency  Dexascan ordered      - XR DEXA BONE DENSITOMETRY (CPT=77080); Future    8. Neck swelling  No masses notes   has had for yrs    I feel the asymetry is more related to the sx she had on the R neck area        9. Impaired fasting glucose  Will remove from registry       The patient indicates understanding of these issues and agrees to the plan.  .

## 2025-04-15 DIAGNOSIS — I10 ESSENTIAL HYPERTENSION: ICD-10-CM

## 2025-04-16 DIAGNOSIS — M10.072 ACUTE IDIOPATHIC GOUT OF LEFT FOOT: Primary | ICD-10-CM

## 2025-04-16 RX ORDER — PREDNISONE 5 MG/1
TABLET ORAL
Qty: 32 TABLET | Refills: 0 | Status: SHIPPED | OUTPATIENT
Start: 2025-04-16 | End: 2025-04-26

## 2025-04-16 RX ORDER — PREDNISONE 5 MG/1
5 TABLET ORAL DAILY
Refills: 0 | Status: CANCELLED | OUTPATIENT
Start: 2025-04-16

## 2025-04-16 RX ORDER — VALSARTAN 160 MG/1
160 TABLET ORAL DAILY
Qty: 90 TABLET | Refills: 0 | Status: SHIPPED | OUTPATIENT
Start: 2025-04-16

## 2025-04-16 RX ORDER — AMLODIPINE BESYLATE 5 MG/1
5 TABLET ORAL DAILY
Qty: 90 TABLET | Refills: 0 | OUTPATIENT
Start: 2025-04-16

## 2025-04-16 RX ORDER — ROSUVASTATIN CALCIUM 10 MG/1
10 TABLET, COATED ORAL NIGHTLY
Qty: 90 TABLET | Refills: 0 | Status: SHIPPED | OUTPATIENT
Start: 2025-04-16

## 2025-04-16 NOTE — TELEPHONE ENCOUNTER
Last office visit: 1/2/2025 video visit     Next Rheum Apt:Visit date not found    Last fill: prednisone 5 mg  1/10/2025  32 tablets     Labs:   Lab Results   Component Value Date    CREATSERUM 0.95 11/29/2024    GFR 66 11/12/2016    ALKPHO 96 11/29/2024    AST 27 11/29/2024    ALT 37 11/29/2024    BILT 0.7 11/29/2024    TP 8.1 11/29/2024    ALB 4.5 11/29/2024       Lab Results   Component Value Date    WBC 6.4 11/29/2024    HGB 14.9 11/29/2024    .0 11/29/2024    NEPRELIM 2.58 11/29/2024    NEPERCENT 40.1 11/29/2024    LYPERCENT 46.0 11/29/2024    NE 2.58 11/29/2024    LYMABS 2.95 11/29/2024

## 2025-04-17 DIAGNOSIS — I10 ESSENTIAL HYPERTENSION: ICD-10-CM

## 2025-04-17 RX ORDER — ROSUVASTATIN CALCIUM 10 MG/1
10 TABLET, COATED ORAL NIGHTLY
Qty: 90 TABLET | Refills: 0 | Status: CANCELLED | OUTPATIENT
Start: 2025-04-17

## 2025-04-17 RX ORDER — VALSARTAN 160 MG/1
160 TABLET ORAL DAILY
Qty: 90 TABLET | Refills: 0 | Status: CANCELLED | OUTPATIENT
Start: 2025-04-17

## 2025-04-17 RX ORDER — AMLODIPINE BESYLATE 5 MG/1
5 TABLET ORAL DAILY
Qty: 90 TABLET | Refills: 0 | Status: CANCELLED | OUTPATIENT
Start: 2025-04-17

## 2025-05-31 ENCOUNTER — LAB ENCOUNTER (OUTPATIENT)
Dept: LAB | Age: 70
End: 2025-05-31
Attending: INTERNAL MEDICINE
Payer: COMMERCIAL

## 2025-05-31 DIAGNOSIS — M10.072 ACUTE IDIOPATHIC GOUT OF LEFT FOOT: ICD-10-CM

## 2025-05-31 DIAGNOSIS — Z00.00 ROUTINE GENERAL MEDICAL EXAMINATION AT A HEALTH CARE FACILITY: ICD-10-CM

## 2025-05-31 DIAGNOSIS — R79.89 LOW VITAMIN B12 LEVEL: ICD-10-CM

## 2025-05-31 DIAGNOSIS — Z51.81 THERAPEUTIC DRUG MONITORING: ICD-10-CM

## 2025-05-31 DIAGNOSIS — E78.00 PURE HYPERCHOLESTEROLEMIA: ICD-10-CM

## 2025-05-31 DIAGNOSIS — M79.672 LEFT FOOT PAIN: ICD-10-CM

## 2025-05-31 LAB
ALBUMIN SERPL-MCNC: 4.8 G/DL (ref 3.2–4.8)
ALBUMIN/GLOB SERPL: 1.7 {RATIO} (ref 1–2)
ALP LIVER SERPL-CCNC: 93 U/L (ref 55–142)
ALT SERPL-CCNC: 37 U/L (ref 10–49)
ANION GAP SERPL CALC-SCNC: 12 MMOL/L (ref 0–18)
AST SERPL-CCNC: 27 U/L (ref ?–34)
BASOPHILS # BLD AUTO: 0.07 X10(3) UL (ref 0–0.2)
BASOPHILS NFR BLD AUTO: 1.1 %
BILIRUB SERPL-MCNC: 0.5 MG/DL (ref 0.2–1.1)
BUN BLD-MCNC: 22 MG/DL (ref 9–23)
CALCIUM BLD-MCNC: 10.1 MG/DL (ref 8.7–10.6)
CHLORIDE SERPL-SCNC: 106 MMOL/L (ref 98–112)
CHOLEST SERPL-MCNC: 168 MG/DL (ref ?–200)
CO2 SERPL-SCNC: 23 MMOL/L (ref 21–32)
CREAT BLD-MCNC: 0.99 MG/DL (ref 0.55–1.02)
EGFRCR SERPLBLD CKD-EPI 2021: 62 ML/MIN/1.73M2 (ref 60–?)
EOSINOPHIL # BLD AUTO: 0.29 X10(3) UL (ref 0–0.7)
EOSINOPHIL NFR BLD AUTO: 4.7 %
ERYTHROCYTE [DISTWIDTH] IN BLOOD BY AUTOMATED COUNT: 13.8 %
EST. AVERAGE GLUCOSE BLD GHB EST-MCNC: 128 MG/DL (ref 68–126)
FASTING PATIENT LIPID ANSWER: YES
FASTING STATUS PATIENT QL REPORTED: YES
GLOBULIN PLAS-MCNC: 2.9 G/DL (ref 2–3.5)
GLUCOSE BLD-MCNC: 93 MG/DL (ref 70–99)
HBA1C MFR BLD: 6.1 % (ref ?–5.7)
HCT VFR BLD AUTO: 44.5 % (ref 35–48)
HDLC SERPL-MCNC: 52 MG/DL (ref 40–59)
HGB BLD-MCNC: 14.6 G/DL (ref 12–16)
IMM GRANULOCYTES # BLD AUTO: 0.02 X10(3) UL (ref 0–1)
IMM GRANULOCYTES NFR BLD: 0.3 %
LDLC SERPL CALC-MCNC: 86 MG/DL (ref ?–100)
LYMPHOCYTES # BLD AUTO: 2.87 X10(3) UL (ref 1–4)
LYMPHOCYTES NFR BLD AUTO: 46.7 %
MCH RBC QN AUTO: 29.7 PG (ref 26–34)
MCHC RBC AUTO-ENTMCNC: 32.8 G/DL (ref 31–37)
MCV RBC AUTO: 90.6 FL (ref 80–100)
MONOCYTES # BLD AUTO: 0.52 X10(3) UL (ref 0.1–1)
MONOCYTES NFR BLD AUTO: 8.5 %
NEUTROPHILS # BLD AUTO: 2.37 X10 (3) UL (ref 1.5–7.7)
NEUTROPHILS # BLD AUTO: 2.37 X10(3) UL (ref 1.5–7.7)
NEUTROPHILS NFR BLD AUTO: 38.7 %
NONHDLC SERPL-MCNC: 116 MG/DL (ref ?–130)
OSMOLALITY SERPL CALC.SUM OF ELEC: 295 MOSM/KG (ref 275–295)
PLATELET # BLD AUTO: 233 10(3)UL (ref 150–450)
POTASSIUM SERPL-SCNC: 4 MMOL/L (ref 3.5–5.1)
PROT SERPL-MCNC: 7.7 G/DL (ref 5.7–8.2)
RBC # BLD AUTO: 4.91 X10(6)UL (ref 3.8–5.3)
SODIUM SERPL-SCNC: 141 MMOL/L (ref 136–145)
TRIGL SERPL-MCNC: 175 MG/DL (ref 30–149)
URATE SERPL-MCNC: 4.4 MG/DL (ref 3.1–7.8)
VIT B12 SERPL-MCNC: 1600 PG/ML (ref 211–911)
VLDLC SERPL CALC-MCNC: 28 MG/DL (ref 0–30)
WBC # BLD AUTO: 6.1 X10(3) UL (ref 4–11)

## 2025-05-31 PROCEDURE — 83036 HEMOGLOBIN GLYCOSYLATED A1C: CPT

## 2025-05-31 PROCEDURE — 36415 COLL VENOUS BLD VENIPUNCTURE: CPT

## 2025-05-31 PROCEDURE — 80061 LIPID PANEL: CPT

## 2025-05-31 PROCEDURE — 84550 ASSAY OF BLOOD/URIC ACID: CPT

## 2025-05-31 PROCEDURE — 80053 COMPREHEN METABOLIC PANEL: CPT

## 2025-05-31 PROCEDURE — 82607 VITAMIN B-12: CPT

## 2025-05-31 PROCEDURE — 85025 COMPLETE CBC W/AUTO DIFF WBC: CPT

## 2025-06-19 ENCOUNTER — PATIENT OUTREACH (OUTPATIENT)
Dept: CASE MANAGEMENT | Age: 70
End: 2025-06-19

## 2025-06-19 NOTE — PROCEDURES
The office order to remove patient from the diabetes registry is Approved and finalized on June 19, 2025.

## 2025-06-28 ENCOUNTER — HOSPITAL ENCOUNTER (OUTPATIENT)
Dept: BONE DENSITY | Age: 70
Discharge: HOME | End: 2025-06-28
Attending: FAMILY MEDICINE
Payer: COMMERCIAL

## 2025-06-28 DIAGNOSIS — Z78.0 POSTMENOPAUSAL ESTROGEN DEFICIENCY: ICD-10-CM

## 2025-06-28 PROCEDURE — 77080 DXA BONE DENSITY AXIAL: CPT | Performed by: FAMILY MEDICINE

## 2025-07-05 ENCOUNTER — HOSPITAL ENCOUNTER (OUTPATIENT)
Dept: ULTRASOUND IMAGING | Age: 70
Discharge: HOME | End: 2025-07-05
Attending: FAMILY MEDICINE
Payer: COMMERCIAL

## 2025-07-05 DIAGNOSIS — I71.40 ABDOMINAL AORTIC ANEURYSM (AAA) WITHOUT RUPTURE, UNSPECIFIED PART: ICD-10-CM

## 2025-07-05 PROCEDURE — 76770 US EXAM ABDO BACK WALL COMP: CPT | Performed by: FAMILY MEDICINE

## 2025-07-10 NOTE — PROGRESS NOTES
HPI:   Zhane Frederick is a 70 year old female who presents for a complete physical exam. Symptoms: denies discharge, itching, burning or dysuria, is menopausal.   Regular SBE- yes,Sexually active- no,  Contraception- pot menopausal. STD history- none    Patient presents for recheck of their hypertension/hyperlipidemia. Pt has been taking medications as instructed but only takes the crestor 3x a week, no medication side effects, home BP monitoring has not been done  Currently asymptomatic, no chest pains, jaw pains, arm pains. No headaches, dizziness or edema.  No SOB on exertion or rest.  Pt has been following a low fat diet and has been exercising 5-7 days a week.     Pt wanted to discuss her labs from May. Labs discussed with Pt. Pt will continue to watch her diet- carbs and fats. Labs will be rechecked in November/December.    BP Readings from Last 3 Encounters:   07/12/25 124/76   04/07/25 145/90   10/31/24 134/88       Screening:  Immunization History   Administered Date(s) Administered    >=3 YRS FLUZONE OR FLUARIX QUAD PRESERVE FREE SINGLE DOSE (73318) FLU CLINIC 11/27/2015    Covid-19 Vaccine Moderna 100 mcg/0.5 ml 04/03/2021, 05/01/2021    Covid-19 Vaccine Moderna 50 Mcg/0.25 Ml 11/30/2021    Covid-19 Vaccine Moderna Bivalent 50mcg/0.5mL 12+ years 10/02/2022, 10/07/2023    DTP 08/10/2001, 10/15/2001    FLU VAC High Dose 65 YRS & Older PRSV Free (50110) 10/02/2022    FLULAVAL 6 months & older 0.5 ml Prefilled syringe (17267) 10/23/2019, 10/10/2020    FLUZONE 6 months and older PFS 0.5 ml (45109) 10/18/2021    HEP B/HIB 08/10/2001, 10/15/2001    High Dose Fluzone Influenza Vaccine, 65yr+ PF 0.5mL (26087) 10/12/2024    IPV 08/10/2001, 10/15/2001    Influenza 11/25/2008, 10/23/2017, 09/19/2018, 10/19/2021, 10/02/2022, 10/07/2023    Moderna Covid-19 Vaccine 50mcg/0.5ml 12yrs+ 10/07/2023, 10/12/2024    Pneumococcal (Prevnar 13) 06/14/2019    Pneumococcal (Prevnar 7) 08/10/2001, 10/15/2001    Pneumovax 23  10/10/2020    RSV, recombinant, RSVpreF, adjuvanted (Arexvy) 11/18/2023    TDAP 10/07/2015    Zoster Vaccine Recombinant Adjuvanted (Shingrix) 06/30/2022, 12/19/2022     Tdap - 10/7/15  DUE  Menarche- 15 yr  PAP- aged out  Any abnormal pap smears- none  Pregnancies- 6, Live births- 1  Mammogram-  4/5/25   Any breast cancer- none, or any gynecological cancer- none, any cancers none  Labs- 5/31/25  DEXA over 65yr- 6/28/25  Flu shot-  fall 2024  Colon- 12/14/23  Glasses/contacts- glasses, last exam- 4/2025  Dental visits- 4/2025    Immunization History   Administered Date(s) Administered    >=3 YRS FLUZONE OR FLUARIX QUAD PRESERVE FREE SINGLE DOSE (57877) FLU CLINIC 11/27/2015    Covid-19 Vaccine Moderna 100 mcg/0.5 ml 04/03/2021, 05/01/2021    Covid-19 Vaccine Moderna 50 Mcg/0.25 Ml 11/30/2021    Covid-19 Vaccine Moderna Bivalent 50mcg/0.5mL 12+ years 10/02/2022, 10/07/2023    DTP 08/10/2001, 10/15/2001    FLU VAC High Dose 65 YRS & Older PRSV Free (44219) 10/02/2022    FLULAVAL 6 months & older 0.5 ml Prefilled syringe (87209) 10/23/2019, 10/10/2020    FLUZONE 6 months and older PFS 0.5 ml (54524) 10/18/2021    HEP B/HIB 08/10/2001, 10/15/2001    High Dose Fluzone Influenza Vaccine, 65yr+ PF 0.5mL (05452) 10/12/2024    IPV 08/10/2001, 10/15/2001    Influenza 11/25/2008, 10/23/2017, 09/19/2018, 10/19/2021, 10/02/2022, 10/07/2023    Moderna Covid-19 Vaccine 50mcg/0.5ml 12yrs+ 10/07/2023, 10/12/2024    Pneumococcal (Prevnar 13) 06/14/2019    Pneumococcal (Prevnar 7) 08/10/2001, 10/15/2001    Pneumovax 23 10/10/2020    RSV, recombinant, RSVpreF, adjuvanted (Arexvy) 11/18/2023    TDAP 10/07/2015    Zoster Vaccine Recombinant Adjuvanted (Shingrix) 06/30/2022, 12/19/2022     Wt Readings from Last 6 Encounters:   07/12/25 139 lb 9.6 oz (63.3 kg)   04/07/25 142 lb 6.4 oz (64.6 kg)   10/31/24 136 lb (61.7 kg)   08/29/24 134 lb 6.4 oz (61 kg)   07/17/24 133 lb 12.8 oz (60.7 kg)   05/04/24 131 lb 9.6 oz (59.7 kg)     Body mass  index is 27.72 kg/m².     Lab Results   Component Value Date    GLU 93 05/31/2025     (H) 11/29/2024    GLU 88 05/30/2024     Lab Results   Component Value Date    CHOLEST 168 05/31/2025    CHOLEST 221 (H) 05/30/2024    CHOLEST 375 (H) 04/08/2023     Lab Results   Component Value Date    HDL 52 05/31/2025    HDL 49 05/30/2024    HDL 65 (H) 04/08/2023     Lab Results   Component Value Date    LDL 86 05/31/2025     (H) 05/30/2024     (H) 04/08/2023     Lab Results   Component Value Date    AST 27 05/31/2025    AST 27 11/29/2024    AST 21 05/30/2024     Lab Results   Component Value Date    ALT 37 05/31/2025    ALT 37 11/29/2024    ALT 23 05/30/2024       Current Medications[1]   Past Medical History[2]   Past Surgical History[3]   Family History[4]   Social History:   Short Social Hx on File[5]  Occ: customer service. : no. Children: 1.   Exercise: 5-7 times per week.  Diet: watches fats closely and watches sugar closely     REVIEW OF SYSTEMS:   GENERAL: feels well otherwise  SKIN: denies any unusual skin lesions  EYES:denies blurred vision or double vision. + cataracts and dry eyes.  HEENT: denies nasal congestion, sinus pain or ST  LUNGS: denies shortness of breath with exertion  CARDIOVASCULAR: denies chest pain on exertion,+HTN  GI: denies abdominal pain,+GERD  : denies dysuria, vaginal discharge or itching  MUSCULOSKELETAL: denies back pain  NEURO: denies headaches  PSYCHE: denies depression or anxiety  HEMATOLOGIC: denies hx of anemia  ENDOCRINE: denies thyroid history,+DM  ALL/ASTHMA: +allergies    EXAM:   /76 (BP Location: Left arm, Patient Position: Sitting, Cuff Size: adult)   Pulse 76   Temp 97.4 °F (36.3 °C) (Temporal)   Resp 14   Ht 4' 11.5\" (1.511 m)   Wt 139 lb 9.6 oz (63.3 kg)   SpO2 97%   BMI 27.72 kg/m²   Body mass index is 27.72 kg/m².   GENERAL: well developed, well nourished,in no apparent distress  SKIN: no rashes,no suspicious lesions  HEENT:  atraumatic, normocephalic,ears and throat are clear  EYES:PERRLA, EOMI, normal optic disk,conjunctiva are clear  NECK: supple,no adenopathy,no bruits  CHEST: no chest tenderness  LUNGS: clear to auscultation  CARDIO: RRR without murmur  GI: good BS's,no masses, HSM or tenderness  :deferred  MUSCULOSKELETAL: back is not tender,FROM of the back  EXTREMITIES: no cyanosis, clubbing or edema  NEURO: Oriented times three,cranial nerves are intact,motor and sensory are grossly intact    ASSESSMENT AND PLAN:   Zhane Frederick is a 70 year old female who presents for a complete physical exam. Mammogram and dexascan are up to date. Health maintenance, will recheck labs in November/December.  Pt' s weight is Body mass index is 27.72 kg/m²., recommended low fat diet and aerobic exercise 30 minutes three times weekly.  The patient indicates understanding of these issues and agrees to the plan.  The patient is asked to return for CPX in one year..  1. Routine general medical examination at a health care facility    - Comp Metabolic Panel (14); Future  - Lipid Panel; Future  - Hemoglobin A1C; Future  - TSH W Reflex To Free T4; Future    2. Pure hypercholesterolemia  Pt to continue their medication, exercise,  choose better fats,  increase fiber and avoid processed foods.    3. Essential hypertension  Conservative measures dicussed. Continue medication.  Diet and exercise explained and encouraged.  Home blood pressure monitoring. Pt should measure BP’s two to three times per week. Goal blood pressure at home - < 135/85.     4. Need for Tdap vaccination  - TdaP (Adacel, Boostrix) [92808]    5. Need for pneumococcal vaccination    - Prevnar 20 (PCV20) [05338]      Encounter Diagnoses   Name Primary?    Routine general medical examination at a health care facility Yes    Pure hypercholesterolemia     Essential hypertension     Need for Tdap vaccination     Need for pneumococcal vaccination        Orders Placed This Encounter    Procedures    Comp Metabolic Panel (14)    Lipid Panel    Hemoglobin A1C    TSH W Reflex To Free T4    Prevnar 20 (PCV20) [25829]    TdaP (Adacel, Boostrix) [10228]       Meds & Refills for this Visit:  Requested Prescriptions      No prescriptions requested or ordered in this encounter       Imaging & Consults:  PCV20 VACCINE FOR INTRAMUSCULAR USE  TETANUS, DIPHTHERIA TOXOIDS AND ACELLULAR PERTUSIS VACCINE (TDAP), >7 YEARS, IM USE         [1]   Current Outpatient Medications   Medication Sig Dispense Refill    rosuvastatin 10 MG Oral Tab Take 1 tablet (10 mg total) by mouth nightly. 90 tablet 0    valsartan 160 MG Oral Tab Take 1 tablet (160 mg total) by mouth daily. 90 tablet 0    amLODIPine 5 MG Oral Tab Take 1 tablet (5 mg total) by mouth daily. 90 tablet 0    allopurinol 100 MG Oral Tab Take 0.5 tablets (50 mg total) by mouth daily for 30 days, THEN 1 tablet (100 mg total) daily for 30 days, THEN 2 tablets (200 mg total) daily for 30 days, THEN 3 tablets (300 mg total) daily. 255 tablet 1    cholecalciferol (VITAMIN D3) 125 MCG (5000 UT) Oral Cap Take 1 capsule (5,000 Units total) by mouth daily.      Omega 3 1000 MG Oral Cap Take 2 capsules by mouth daily.      Zinc 50 MG Oral Tab Take 1 tablet by mouth daily.      MAGNESIUM GLYCINATE OR Take 1 tablet by mouth daily.      allopurinol 100 MG Oral Tab Take 0.5 tablets (50 mg total) by mouth daily for 30 days, THEN 1 tablet (100 mg total) daily for 30 days, THEN 2 tablets (200 mg total) daily for 30 days, THEN 3 tablets (300 mg total) daily. 235 tablet 1    Cyanocobalamin 1000 MCG Oral Cap Take 1 capsule by mouth daily. (Patient not taking: Reported on 7/12/2025)     [2]   Past Medical History:   Allergic rhinitis    Arthritis    Benign neoplasm of colon    Essential hypertension    Controlled by medications    Hyperlipidemia    LDL elevated    Osteoarthritis    Had bilateral TKR 2012 & 2018    Pure hypercholesterolemia   [3]   Past Surgical History:  Procedure  Laterality Date    Breast surgery procedure unlisted      left breast biopsy during breast surgery      2001    Colonoscopy  10/26/2018    Colonoscopy & polypectomy  5/09   10/24/14    yvette 5  Jorge    D & c  , ,,    Dilation & curettage cervical stump      x5 pt has had 5 miscarraiges and  a D&C after each one    Excision, infec graft, neck  late     nodule removed from R side of neck 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    Knee replacement surgery Right     Anay    Laparoscopy procedure unlisted  1999    (diagnostic) for endometriosis    Sarahi biopsy stereo nodule 1 site left (cpt=19081)  2006    benign    Sarahi biopsy stereo nodule 2 site bilat (cpt=19081/25815)      negra    Sarahi localization wire 1 site right (cpt=19281)      negra    Other surgical history  2002    excision of neuroma of foot    Total knee replacement Left        [4]   Family History  Problem Relation Age of Onset    Hypertension Mother     Obesity Mother     Stroke Mother     Hypertension Brother     Obesity Brother    [5]   Social History  Socioeconomic History    Marital status:    Tobacco Use    Smoking status: Never    Smokeless tobacco: Never   Vaping Use    Vaping status: Never Used   Substance and Sexual Activity    Alcohol use: Never    Drug use: Never    Sexual activity: Never   Other Topics Concern    Caffeine Concern Yes     Comment: 1 cup of coffee daily.    Stress Concern Yes    Weight Concern No    Special Diet Yes    Exercise No    Seat Belt Yes     Social Drivers of Health     Food Insecurity: No Food Insecurity (2025)    NCSS - Food Insecurity     Worried About Running Out of Food in the Last Year: No     Ran Out of Food in the Last Year: No   Transportation Needs: No Transportation Needs (2025)    NCSS - Transportation     Lack of Transportation: No   Housing Stability: Not At Risk (2025)    NCSS -  Housing/Utilities     Has Housing: Yes     Worried About Losing Housing: No     Unable to Get Utilities: No

## 2025-07-12 ENCOUNTER — OFFICE VISIT (OUTPATIENT)
Dept: INTERNAL MEDICINE CLINIC | Facility: CLINIC | Age: 70
End: 2025-07-12
Payer: COMMERCIAL

## 2025-07-12 VITALS
OXYGEN SATURATION: 97 % | HEART RATE: 76 BPM | TEMPERATURE: 97 F | DIASTOLIC BLOOD PRESSURE: 76 MMHG | HEIGHT: 59.5 IN | RESPIRATION RATE: 14 BRPM | SYSTOLIC BLOOD PRESSURE: 124 MMHG | WEIGHT: 139.63 LBS | BODY MASS INDEX: 27.78 KG/M2

## 2025-07-12 DIAGNOSIS — Z23 NEED FOR PNEUMOCOCCAL VACCINATION: ICD-10-CM

## 2025-07-12 DIAGNOSIS — Z23 NEED FOR TDAP VACCINATION: ICD-10-CM

## 2025-07-12 DIAGNOSIS — I10 ESSENTIAL HYPERTENSION: ICD-10-CM

## 2025-07-12 DIAGNOSIS — Z00.00 ROUTINE GENERAL MEDICAL EXAMINATION AT A HEALTH CARE FACILITY: Primary | ICD-10-CM

## 2025-07-12 DIAGNOSIS — E78.00 PURE HYPERCHOLESTEROLEMIA: ICD-10-CM

## 2025-08-13 DIAGNOSIS — M10.072 ACUTE IDIOPATHIC GOUT OF LEFT FOOT: ICD-10-CM

## 2025-08-13 RX ORDER — ALLOPURINOL 100 MG/1
TABLET ORAL
Qty: 90 TABLET | Refills: 0 | Status: SHIPPED | OUTPATIENT
Start: 2025-08-13

## 2025-08-15 ENCOUNTER — PATIENT MESSAGE (OUTPATIENT)
Dept: RHEUMATOLOGY | Facility: CLINIC | Age: 70
End: 2025-08-15

## 2025-08-15 DIAGNOSIS — M10.072 ACUTE IDIOPATHIC GOUT OF LEFT FOOT: Primary | ICD-10-CM

## 2025-08-15 RX ORDER — PREDNISONE 5 MG/1
TABLET ORAL
Qty: 32 TABLET | Refills: 0 | Status: SHIPPED | OUTPATIENT
Start: 2025-08-15 | End: 2025-08-25

## 2025-08-23 DIAGNOSIS — I10 ESSENTIAL HYPERTENSION: ICD-10-CM

## 2025-08-25 RX ORDER — AMLODIPINE BESYLATE 5 MG/1
5 TABLET ORAL DAILY
Qty: 90 TABLET | Refills: 0 | Status: SHIPPED | OUTPATIENT
Start: 2025-08-25

## 2025-08-25 RX ORDER — VALSARTAN 160 MG/1
160 TABLET ORAL DAILY
Qty: 90 TABLET | Refills: 0 | Status: SHIPPED | OUTPATIENT
Start: 2025-08-25

## (undated) NOTE — MR AVS SNAPSHOT
[place]  [address]  [address] [facility] [id number] [phone]               Thank you for choosing us for your health care visit with Rocio Velasco MD.  We are glad to serve you and happy to provide you with this sum Biotin 5 MG Caps   Take 1 capsule by mouth daily. MULTI MAX OR   Take  by mouth. Naproxen Sodium 220 MG Caps   Take 220 mg by mouth every 8 (eight) hours as needed.            Rosuvastatin Calcium 10 MG Tabs   Take 1 tablet (10 mg to Eat plenty of protein, keep the fat content low Sugars:  sodas and sports drinks, candies and desserts   Eat plenty of low-fat dairy products High fat meats and dairy   Choose whole grain products Foods high in sodium   Water is best for hydration Fast rg

## (undated) NOTE — LETTER
07/26/21        [address]  [place] [phone]      Dear Yola [place] records indicate that you have outstanding lab work and or testing that was ordered for you and has not yet been completed:  Orders Placed This

## (undated) NOTE — LETTER
01/13/21        [address]  [place] [phone]      Dear Michael [place] records indicate that you have outstanding lab work and or testing that was ordered for you and has not yet been completed:  Orders Placed This

## (undated) NOTE — LETTER
10/28/19        [place] [phone]      Dear Jose Miguel Hackett,    [facility] records indicate that you have outstanding lab work and or testing that was ordered for you and has not yet been completed: Fasting lab work   *fas

## (undated) NOTE — LETTER
08/28/19        [place] [phone]      Dear Coty Stevenson,    [facility] records indicate that you have outstanding lab work and or testing that was ordered for you and has not yet been completed: Ultrasound of Abdominal

## (undated) NOTE — LETTER
11/22/19        [place] [phone]      Dear Elizabeth [place] records indicate that you have outstanding lab work and or testing that was ordered for you and has not yet been completed: Non Fasting Lab Work  +

## (undated) NOTE — LETTER
10/16/18        [place] [phone]      Dear Jose Miguel Hackett,    [address] records indicate that you have outstanding lab work and or testing that was ordered for you and has not yet been completed:  Orders Placed This Enc